# Patient Record
Sex: FEMALE | Race: OTHER | HISPANIC OR LATINO | URBAN - METROPOLITAN AREA
[De-identification: names, ages, dates, MRNs, and addresses within clinical notes are randomized per-mention and may not be internally consistent; named-entity substitution may affect disease eponyms.]

---

## 2023-04-24 ENCOUNTER — INPATIENT (INPATIENT)
Facility: HOSPITAL | Age: 38
LOS: 1 days | Discharge: HOME | DRG: 373 | End: 2023-04-26
Attending: FAMILY MEDICINE | Admitting: FAMILY MEDICINE
Payer: MEDICAID

## 2023-04-24 VITALS — WEIGHT: 126.1 LBS

## 2023-04-24 DIAGNOSIS — Z29.9 ENCOUNTER FOR PROPHYLACTIC MEASURES, UNSPECIFIED: ICD-10-CM

## 2023-04-24 DIAGNOSIS — K56.609 UNSPECIFIED INTESTINAL OBSTRUCTION, UNSPECIFIED AS TO PARTIAL VERSUS COMPLETE OBSTRUCTION: ICD-10-CM

## 2023-04-24 DIAGNOSIS — A15.9 RESPIRATORY TUBERCULOSIS UNSPECIFIED: ICD-10-CM

## 2023-04-24 LAB
ALBUMIN SERPL ELPH-MCNC: 3.9 G/DL — SIGNIFICANT CHANGE UP (ref 3.3–5)
ALP SERPL-CCNC: 102 U/L — SIGNIFICANT CHANGE UP (ref 40–120)
ALT FLD-CCNC: 48 U/L — SIGNIFICANT CHANGE UP (ref 12–78)
ANION GAP SERPL CALC-SCNC: 6 MMOL/L — SIGNIFICANT CHANGE UP (ref 5–17)
APPEARANCE UR: CLEAR — SIGNIFICANT CHANGE UP
AST SERPL-CCNC: 29 U/L — SIGNIFICANT CHANGE UP (ref 15–37)
BASOPHILS # BLD AUTO: 0.04 K/UL — SIGNIFICANT CHANGE UP (ref 0–0.2)
BASOPHILS NFR BLD AUTO: 0.4 % — SIGNIFICANT CHANGE UP (ref 0–2)
BILIRUB SERPL-MCNC: 0.3 MG/DL — SIGNIFICANT CHANGE UP (ref 0.2–1.2)
BILIRUB UR-MCNC: NEGATIVE — SIGNIFICANT CHANGE UP
BUN SERPL-MCNC: 8 MG/DL — SIGNIFICANT CHANGE UP (ref 7–23)
CALCIUM SERPL-MCNC: 9 MG/DL — SIGNIFICANT CHANGE UP (ref 8.5–10.1)
CHLORIDE SERPL-SCNC: 107 MMOL/L — SIGNIFICANT CHANGE UP (ref 96–108)
CO2 SERPL-SCNC: 26 MMOL/L — SIGNIFICANT CHANGE UP (ref 22–31)
COLOR SPEC: SIGNIFICANT CHANGE UP
CREAT SERPL-MCNC: 0.79 MG/DL — SIGNIFICANT CHANGE UP (ref 0.5–1.3)
DIFF PNL FLD: NEGATIVE — SIGNIFICANT CHANGE UP
EGFR: 98 ML/MIN/1.73M2 — SIGNIFICANT CHANGE UP
EOSINOPHIL # BLD AUTO: 0.06 K/UL — SIGNIFICANT CHANGE UP (ref 0–0.5)
EOSINOPHIL NFR BLD AUTO: 0.6 % — SIGNIFICANT CHANGE UP (ref 0–6)
GLUCOSE SERPL-MCNC: 103 MG/DL — HIGH (ref 70–99)
GLUCOSE UR QL: NEGATIVE — SIGNIFICANT CHANGE UP
HCG SERPL-ACNC: <1 MIU/ML — SIGNIFICANT CHANGE UP
HCT VFR BLD CALC: 40.6 % — SIGNIFICANT CHANGE UP (ref 34.5–45)
HGB BLD-MCNC: 12.8 G/DL — SIGNIFICANT CHANGE UP (ref 11.5–15.5)
IMM GRANULOCYTES NFR BLD AUTO: 0.4 % — SIGNIFICANT CHANGE UP (ref 0–0.9)
KETONES UR-MCNC: ABNORMAL
LEUKOCYTE ESTERASE UR-ACNC: NEGATIVE — SIGNIFICANT CHANGE UP
LIDOCAIN IGE QN: 94 U/L — SIGNIFICANT CHANGE UP (ref 73–393)
LYMPHOCYTES # BLD AUTO: 0.87 K/UL — LOW (ref 1–3.3)
LYMPHOCYTES # BLD AUTO: 8.6 % — LOW (ref 13–44)
MCHC RBC-ENTMCNC: 25.5 PG — LOW (ref 27–34)
MCHC RBC-ENTMCNC: 31.5 GM/DL — LOW (ref 32–36)
MCV RBC AUTO: 80.9 FL — SIGNIFICANT CHANGE UP (ref 80–100)
MONOCYTES # BLD AUTO: 1.15 K/UL — HIGH (ref 0–0.9)
MONOCYTES NFR BLD AUTO: 11.4 % — SIGNIFICANT CHANGE UP (ref 2–14)
NEUTROPHILS # BLD AUTO: 7.9 K/UL — HIGH (ref 1.8–7.4)
NEUTROPHILS NFR BLD AUTO: 78.6 % — HIGH (ref 43–77)
NITRITE UR-MCNC: NEGATIVE — SIGNIFICANT CHANGE UP
NRBC # BLD: 0 /100 WBCS — SIGNIFICANT CHANGE UP (ref 0–0)
PH UR: 6.5 — SIGNIFICANT CHANGE UP (ref 5–8)
PLATELET # BLD AUTO: 170 K/UL — SIGNIFICANT CHANGE UP (ref 150–400)
POTASSIUM SERPL-MCNC: 4.2 MMOL/L — SIGNIFICANT CHANGE UP (ref 3.5–5.3)
POTASSIUM SERPL-SCNC: 4.2 MMOL/L — SIGNIFICANT CHANGE UP (ref 3.5–5.3)
PROT SERPL-MCNC: 8.3 G/DL — SIGNIFICANT CHANGE UP (ref 6–8.3)
PROT UR-MCNC: NEGATIVE — SIGNIFICANT CHANGE UP
RBC # BLD: 5.02 M/UL — SIGNIFICANT CHANGE UP (ref 3.8–5.2)
RBC # FLD: 17.2 % — HIGH (ref 10.3–14.5)
SODIUM SERPL-SCNC: 139 MMOL/L — SIGNIFICANT CHANGE UP (ref 135–145)
SP GR SPEC: 1 — LOW (ref 1.01–1.02)
UROBILINOGEN FLD QL: NEGATIVE — SIGNIFICANT CHANGE UP
WBC # BLD: 10.06 K/UL — SIGNIFICANT CHANGE UP (ref 3.8–10.5)
WBC # FLD AUTO: 10.06 K/UL — SIGNIFICANT CHANGE UP (ref 3.8–10.5)

## 2023-04-24 PROCEDURE — 99285 EMERGENCY DEPT VISIT HI MDM: CPT

## 2023-04-24 PROCEDURE — 76705 ECHO EXAM OF ABDOMEN: CPT | Mod: 26

## 2023-04-24 PROCEDURE — 74177 CT ABD & PELVIS W/CONTRAST: CPT | Mod: 26,MA

## 2023-04-24 PROCEDURE — 99223 1ST HOSP IP/OBS HIGH 75: CPT | Mod: GC

## 2023-04-24 RX ORDER — ACETAMINOPHEN 500 MG
650 TABLET ORAL EVERY 6 HOURS
Refills: 0 | Status: DISCONTINUED | OUTPATIENT
Start: 2023-04-24 | End: 2023-04-26

## 2023-04-24 RX ORDER — SODIUM CHLORIDE 9 MG/ML
1000 INJECTION INTRAMUSCULAR; INTRAVENOUS; SUBCUTANEOUS ONCE
Refills: 0 | Status: COMPLETED | OUTPATIENT
Start: 2023-04-24 | End: 2023-04-24

## 2023-04-24 RX ORDER — FAMOTIDINE 10 MG/ML
20 INJECTION INTRAVENOUS ONCE
Refills: 0 | Status: COMPLETED | OUTPATIENT
Start: 2023-04-24 | End: 2023-04-24

## 2023-04-24 RX ORDER — ACETAMINOPHEN 500 MG
1000 TABLET ORAL ONCE
Refills: 0 | Status: COMPLETED | OUTPATIENT
Start: 2023-04-24 | End: 2023-04-24

## 2023-04-24 RX ORDER — ETHAMBUTOL HYDROCHLORIDE 400 MG/1
1000 TABLET, FILM COATED ORAL DAILY
Refills: 0 | Status: DISCONTINUED | OUTPATIENT
Start: 2023-04-24 | End: 2023-04-26

## 2023-04-24 RX ORDER — ONDANSETRON 8 MG/1
4 TABLET, FILM COATED ORAL ONCE
Refills: 0 | Status: COMPLETED | OUTPATIENT
Start: 2023-04-24 | End: 2023-04-24

## 2023-04-24 RX ORDER — KETOROLAC TROMETHAMINE 30 MG/ML
15 SYRINGE (ML) INJECTION ONCE
Refills: 0 | Status: DISCONTINUED | OUTPATIENT
Start: 2023-04-24 | End: 2023-04-24

## 2023-04-24 RX ORDER — ONDANSETRON 8 MG/1
4 TABLET, FILM COATED ORAL EVERY 4 HOURS
Refills: 0 | Status: DISCONTINUED | OUTPATIENT
Start: 2023-04-24 | End: 2023-04-26

## 2023-04-24 RX ORDER — ALBUTEROL 90 UG/1
2 AEROSOL, METERED ORAL EVERY 6 HOURS
Refills: 0 | Status: DISCONTINUED | OUTPATIENT
Start: 2023-04-24 | End: 2023-04-26

## 2023-04-24 RX ORDER — PYRIDOXINE HCL (VITAMIN B6) 100 MG
50 TABLET ORAL DAILY
Refills: 0 | Status: DISCONTINUED | OUTPATIENT
Start: 2023-04-24 | End: 2023-04-26

## 2023-04-24 RX ORDER — LANOLIN ALCOHOL/MO/W.PET/CERES
3 CREAM (GRAM) TOPICAL AT BEDTIME
Refills: 0 | Status: DISCONTINUED | OUTPATIENT
Start: 2023-04-24 | End: 2023-04-26

## 2023-04-24 RX ADMIN — SODIUM CHLORIDE 1000 MILLILITER(S): 9 INJECTION INTRAMUSCULAR; INTRAVENOUS; SUBCUTANEOUS at 18:51

## 2023-04-24 RX ADMIN — ONDANSETRON 4 MILLIGRAM(S): 8 TABLET, FILM COATED ORAL at 14:31

## 2023-04-24 RX ADMIN — Medication 400 MILLIGRAM(S): at 14:32

## 2023-04-24 RX ADMIN — Medication 1000 MILLIGRAM(S): at 14:47

## 2023-04-24 RX ADMIN — FAMOTIDINE 20 MILLIGRAM(S): 10 INJECTION INTRAVENOUS at 17:30

## 2023-04-24 RX ADMIN — Medication 30 MILLILITER(S): at 17:30

## 2023-04-24 RX ADMIN — Medication 1000 MILLIGRAM(S): at 15:02

## 2023-04-24 RX ADMIN — SODIUM CHLORIDE 1000 MILLILITER(S): 9 INJECTION INTRAMUSCULAR; INTRAVENOUS; SUBCUTANEOUS at 17:51

## 2023-04-24 RX ADMIN — Medication 400 MILLIGRAM(S): at 21:44

## 2023-04-24 RX ADMIN — ONDANSETRON 4 MILLIGRAM(S): 8 TABLET, FILM COATED ORAL at 17:51

## 2023-04-24 RX ADMIN — SODIUM CHLORIDE 1000 MILLILITER(S): 9 INJECTION INTRAMUSCULAR; INTRAVENOUS; SUBCUTANEOUS at 15:31

## 2023-04-24 RX ADMIN — Medication 15 MILLIGRAM(S): at 18:22

## 2023-04-24 RX ADMIN — SODIUM CHLORIDE 1000 MILLILITER(S): 9 INJECTION INTRAMUSCULAR; INTRAVENOUS; SUBCUTANEOUS at 14:31

## 2023-04-24 RX ADMIN — ONDANSETRON 4 MILLIGRAM(S): 8 TABLET, FILM COATED ORAL at 21:50

## 2023-04-24 RX ADMIN — Medication 15 MILLIGRAM(S): at 17:52

## 2023-04-24 RX ADMIN — Medication 1000 MILLIGRAM(S): at 22:14

## 2023-04-24 NOTE — H&P ADULT - NSHPPHYSICALEXAM_GEN_ALL_CORE
GENERAL: patient appears well, NAD, appropriate, pleasant  EYES: sclera clear, no exudates  ENMT: oropharynx clear without erythema, no exudates, mmm  NECK: supple, soft, no thyromegaly noted  LUNGS: CTA b/l, good air entry b/l, symmetric breath sounds, no w/r/r  HEART: soft S1/S2, RRR, no m/r/g, no lower extremity edema  GASTROINTESTINAL: Soft NTND, Bowel sounds normactive, no palpable masses  INTEGUMENT: good skin turgor, warm skin, appears well perfused  MUSCULOSKELETAL: no clubbing or cyanosis, no obvious deformity  NEUROLOGIC: A&O x3, good muscle tone in 4 extremities, no obvious sensory deficits  PSYCHIATRIC: mood is good, affect is congruent, linear and logical thought process  HEME/LYMPH: no palpable supraclavicular nodules, no obvious ecchymosis or petechiae GENERAL: patient appears well, NAD, appropriate, pleasant  EYES: sclera clear, no exudates  ENMT: oropharynx clear without erythema, no exudates, mmm  LUNGS: CTA b/l, good air entry b/l, symmetric breath sounds, no w/r/r  HEART: soft S1/S2, RRR, no m/r/g, no lower extremity edema  GASTROINTESTINAL: Soft TTP epigastric/R abd, non-distended, Bowel sounds normactive, no palpable masses  INTEGUMENT: good skin turgor, warm skin, appears well perfused  MUSCULOSKELETAL: no clubbing or cyanosis, no obvious deformity  NEUROLOGIC: A&O x3, good muscle tone in 4 extremities, no obvious sensory deficits  PSYCHIATRIC: mood is good, affect is congruent, linear and logical thought process  HEME/LYMPH: no palpable supraclavicular nodules, no obvious ecchymosis or petechiae GENERAL: patient appears well, NAD, appropriate, pleasant  EYES: sclera clear, no exudates  ENMT: oropharynx clear without erythema, no exudates, mmm  LUNGS: CTA b/l, good air entry b/l, symmetric breath sounds, no w/r/r  HEART: soft S1/S2, RRR, no m/r/g, no lower extremity edema  GASTROINTESTINAL: Soft, TTP epigastric/R abd, non-distended, Bowel sounds normactive, no palpable masses  INTEGUMENT: good skin turgor, warm skin, appears well perfused  MUSCULOSKELETAL: no clubbing or cyanosis, no obvious deformity  NEUROLOGIC: A&O x3, good muscle tone in 4 extremities, no obvious sensory deficits  PSYCHIATRIC: mood is good, affect is congruent, linear and logical thought process  HEME/LYMPH: no palpable supraclavicular nodules, no obvious ecchymosis or petechiae

## 2023-04-24 NOTE — H&P ADULT - ATTENDING COMMENTS
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 Admit for SBO likely 2/2 abdominal mycobacteria bovis.Plan: monitor i/os, apprec gen surg recs, apprec ID recs, monitor clinical course, pain control on demand, serial abd exams, apprec GI recs

## 2023-04-24 NOTE — H&P ADULT - PROBLEM SELECTOR PLAN 2
SCDs for DVT prophylaxis pending possible though unlikely OR Admitted to [facility] 3 weeks ago for 9 days and was started on anti-tb abx; was initially told latent though positive sputum culture per patient  Concern for abdominal extention of infx per CT findings as follows  CT A&P:   - Heterogeneous left subdiaphragmatic peritoneal nodularity measuring up to 8.2 cm in conglomerate, which is favored to be infectious but peritoneal metastases not excluded.  - 1.5 cm ill-defined hypodensity in the inferior pole left kidney, which may represent infection and possibly same process as the bowel and peritoneal processes.  - Mild right lower quadrant mesenteric lymphadenopathy, which may be reactive or metastatic.  Continue ethambutol, rifampin, moxifloxicin, vit b6  ID consulted, f/u recs  GI consult, f/u recs Admitted to [facility] 3 weeks ago for 9 days and was started on anti-tb abx; was initially told latent though positive sputum culture per patient  Concern for abdominal extension of infx per CT findings as follows  CT A&P:   - Heterogeneous left subdiaphragmatic peritoneal nodularity measuring up to 8.2 cm in conglomerate, which is favored to be infectious but peritoneal metastases not excluded.  - 1.5 cm ill-defined hypodensity in the inferior pole left kidney, which may represent infection and possibly same process as the bowel and peritoneal processes.  - Mild right lower quadrant mesenteric lymphadenopathy, which may be reactive or metastatic.  Continue ethambutol, rifampin, moxifloxicin (pt  to bring from home), and vit b6  Out of isolation period  ID consulted, f/u recs  GI consult, f/u recs Pulmonary w/ concern for abdominal extension based on CT imaging  Admitted to [facility] 3 weeks ago for 9 days and was started on anti-tb abx; was initially told latent though positive sputum culture per patient  CT A&P: Heterogeneous left subdiaphragmatic peritoneal nodularity measuring up to 8.2 cm in conglomerate. 1.5 cm ill-defined hypodensity in the inferior pole left kidney, which may represent infection and possibly same process as the bowel and peritoneal processes. Mild right lower quadrant mesenteric lymphadenopathy, which may be reactive or metastatic.  Continue ethambutol, rifampin, moxifloxacin (pt  to bring from home), and vit b6  Out of isolation period  ID consulted, f/u recs  GI consult, f/u recs

## 2023-04-24 NOTE — ED PROVIDER NOTE - PHYSICAL EXAMINATION
Vital signs as available reviewed.  General:  No acute distress.  Head:  Normocephalic, atraumatic.  Eyes:  Conjunctiva pink, no icterus.  Cardiovascular:  Regular rate, no obvious murmur.  Respiratory:  Clear to auscultation, good air entry bilaterally.  Abdomen:  Soft, + Parker's sign. + RLQ tenderness to palpation.  Musculoskeletal:  No obvious deformity.  Neurologic: Alert and oriented, moving all extremities.  Skin:  Warm and dry.

## 2023-04-24 NOTE — H&P ADULT - PROBLEM SELECTOR PLAN 1
Surgery consulted Dr. Quijano recs appreciated  - Conservative management  - Low threshold to place NGT if patient has another episode of emesis  - Continue TB medications  - pSBO likely secondary to inflammation from infectious process, making patient a poor candidate for surgery/low indication for necessity Partial SBO  S/P 1L NS bolus x 2; Ofirmev famotidine, ketorolac, Zofran x 2, Maalox x 1  CT A&P -  Partial small bowel obstruction due to irregular bowel wall thickening and inflammation involving the terminal ileum, cecum and base of the appendix.  RUQ U/S - Mild hepatomegaly. Question mild liver surface nodularity, raising the possibility of underlying cirrhosis. Unremarkable sonographic appearance of the gallbladder. No biliary duct dilation. Trace perihepatic ascites.  Lead point for SBO likely related to abd TB/infx process  NPO except meds  Conservative management  GI consult, f/u recs  Surgery consulted Dr. Quijano, recs appreciated  - Low threshold to place NGT if patient has another episode of emesis  - pSBO likely secondary to inflammation from infectious process, making patient a poor candidate for surgery/low indication for necessity Partial SBO  S/P 1L NS bolus x 2; Ofirmev, famotidine, ketorolac, Zofran x 2, Maalox x 1  CT A&P -  Partial small bowel obstruction due to irregular bowel wall thickening and inflammation involving the terminal ileum, cecum and base of the appendix.  RUQ U/S - Mild hepatomegaly. Question mild liver surface nodularity, raising the possibility of underlying cirrhosis. Unremarkable sonographic appearance of the gallbladder. No biliary duct dilation. Trace perihepatic ascites.  Lead point for SBO likely related to abd TB/infx process  NPO except meds  Conservative mgmt  GI consult, f/u recs  Surgery consulted Dr. Quijano, recs appreciated  - Low threshold to place NGT if patient has another episode of emesis  - pSBO likely secondary to inflammation from infectious process, making patient a poor candidate for surgery/low indication for necessity Partial SBO  S/P 1L NS bolus x 2; Ofirmev, famotidine, ketorolac, Zofran x 2, Maalox x 1  CT A&P -  Partial small bowel obstruction due to irregular bowel wall thickening and inflammation involving the terminal ileum, cecum and base of the appendix.  RUQ U/S - Mild hepatomegaly. Question mild liver surface nodularity, raising the possibility of underlying cirrhosis. Unremarkable sonographic appearance of the gallbladder. No biliary duct dilation. Trace perihepatic ascites.  SBO likely 2/2 to abd TB/infx process  NPO except meds  Conservative mgmt&  Strict I/Os  GI consult, f/u recs  Surgery consulted Dr. Quijano, recs appreciated  - Low threshold to place NGT if patient has another episode of emesis  - pSBO likely secondary to inflammation from infectious process, making patient a poor candidate for surgery/low indication for necessity

## 2023-04-24 NOTE — ED ADULT NURSE NOTE - OBJECTIVE STATEMENT
Pt presents to the ED s/p abd pain, nausea, vomiting since yesterday afternoon. Pt denies fever, chills, diarrhea.

## 2023-04-24 NOTE — ED PROVIDER NOTE - OBJECTIVE STATEMENT
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  Pain crampy and intermittent, no radiation of pain.  Pain started yesterday.  No meds taken for pain.  No sick contacts.  No reported fevers or chills.

## 2023-04-24 NOTE — H&P ADULT - PROBLEM SELECTOR PLAN 3
SCDs for DVT prophylaxis pending possible though unlikely OR SCDs and ambulation for DVT prophylaxis pending possible though unlikely OR SCDs and ambulation for DVT prophylaxis pending possible though unlikely OR        #Medication mgmt  Relvar inhaler - family should be notified to bring from home if used daily, not on formulary  Moxifloxacin -  aware and will bring to hospital

## 2023-04-24 NOTE — H&P ADULT - ASSESSMENT
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 Admit for SBO likely 2/2 abdominal mycobacteria bovis.      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  Pain crampy and intermittent, no radiation of pain.  Pain started yesterday.  No meds taken for pain.  No sick contacts.  No reported fevers or chills. Since November of 2023 patient has had pleuritic chest pain, night sweats, and cough and 3 weeks ago she was dx w/ latent TB and subsequently informed it was active TB w/ positive sputum; admitted for 9 days at [facility] and started on anti-tb trx (ethambutol, rifampin, and moxifloxacin 2/2 rxn to [facility]), Follows with TB clinic at [facility]. Pt from [facility], moved to the  2 years ago. Denies chest pain, palpitations, SOB, cough, diarrhea, constipation, urinary frequency, urgency, or dysuria, headaches, changes in vision, dizziness, numbness, tingling. Denies recent travel or sick contacts.     IN THE ED:  Temp  99.1 F , HR 91 , BP 92/60  ,RR 18 , SpO2 99% on RA  S/P 1L NS bolus x 2; Ofirmev famotidine, ketorolac, Zofran x 2, Maalox x 1  Imaging:   CT A&P:   - Partial small bowel obstruction due to irregular bowel wall thickening and inflammation involving the terminal ileum, cecum and base of the appendix. Differential considerations include infection (including atypical/tuberculous infection given patient's history), inflammatory   bowel disease, or malignancy. Consider correlation with colonoscopy.  - Heterogeneous left subdiaphragmatic peritoneal nodularity measuring up to 8.2 cm in conglomerate, which is favored to be infectious but peritoneal metastases not excluded.  - 1.5 cm ill-defined hypodensity in the inferior pole left kidney, which may represent infection and possibly same process as the bowel and peritoneal processes.  - Mild right lower quadrant mesenteric lymphadenopathy, which may be reactive or metastatic. 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 Admit for SBO likely 2/2 abdominal mycobacteria bovis.

## 2023-04-24 NOTE — H&P ADULT - HISTORY OF PRESENT ILLNESS
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  Pain crampy and intermittent, no radiation of pain.  Pain started yesterday.  No meds taken for pain.  No sick contacts.  No reported fevers or chills.    IN THE ED:  Temp  99.1 F , HR 91 , BP 92/60  ,RR 18 , SpO2 99% on RA  S/P 1L NS bolus x 2; Ofirmev famotidine, ketorolac, Zofran x 2, Maalox x 1  EKG:  Imaging:   CT A&P:   - Partial small bowel obstruction due to irregular bowel wall thickening and inflammation involving the terminal ileum, cecum and base of the appendix. Differential considerations include infection (including atypical/tuberculous infection given patient's history), inflammatory   bowel disease, or malignancy. Consider correlation with colonoscopy.  - Heterogeneous left subdiaphragmatic peritoneal nodularity measuring up to 8.2 cm in conglomerate, which is favored to be infectious but peritoneal metastases not excluded.  - 1.5 cm ill-defined hypodensity in the inferior pole left kidney, which may represent infection and possibly same process as the bowel and peritoneal processes.  - Mild right lower quadrant mesenteric lymphadenopathy, which may be reactive or metastatic.    RUQ U/S - Mild hepatomegaly. Question mild liver surface nodularity, raising the possibility of underlying cirrhosis. Unremarkable sonographic appearance of the gallbladder. No biliary duct dilation.Trace perihepatic ascites. 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  Pain crampy and intermittent, no radiation of pain.  Pain started yesterday.  No meds taken for pain.  No sick contacts.  No reported fevers or chills. Since November of 2023 patient has had pleuritic chest pain, night sweats, and cough and 3 weeks ago she was dx w/ latent TB and subsequently informed it was active TB w/ positive sputum; admitted for 9 days at [facility] and started on anti-tb trx (ethambutol, rifampin, and moxifloxacin 2/2 rxn to [facility]), Follows with TB clinic at [facility]. Pt from [facility], [place] the [place] 2 [place] ago. Denies chest pain, palpitations, SOB, cough, diarrhea, constipation, urinary frequency, urgency, or dysuria, headaches, changes in vision, dizziness, numbness, tingling. Denies recent travel or sick contacts.     IN THE ED:  Temp  99.1 F , HR 91 , BP 92/60  ,RR 18 , SpO2 99% on RA  S/P 1L NS bolus x 2; Ofirmev famotidine, ketorolac, Zofran x 2, Maalox x 1  Imaging:   CT A&P:   - Partial small bowel obstruction due to irregular bowel wall thickening and inflammation involving the terminal ileum, cecum and base of the appendix. Differential considerations include infection (including atypical/tuberculous infection given patient's history), inflammatory   bowel disease, or malignancy. Consider correlation with colonoscopy.  - Heterogeneous left subdiaphragmatic peritoneal nodularity measuring up to 8.2 cm in conglomerate, which is favored to be infectious but peritoneal metastases not excluded.  - 1.5 cm ill-defined hypodensity in the inferior pole left kidney, which may represent infection and possibly same process as the bowel and peritoneal processes.  - Mild right lower quadrant mesenteric lymphadenopathy, which may be reactive or metastatic.    RUQ U/S - Mild hepatomegaly. Question mild liver surface nodularity, raising the possibility of underlying cirrhosis. Unremarkable sonographic appearance of the gallbladder. No biliary duct dilation.Trace perihepatic ascites. 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  Pain crampy and intermittent, no radiation of pain.  Pain started yesterday.  No meds taken for pain. Since November of 2023 patient has had pleuritic chest pain, night sweats, and cough and per patient 3 weeks ago she was dx w/ latent TB and subsequently informed it was active TB w/ positive sputum; admitted for 9 days at [facility] and started on anti-tb trx (ethambutol, rifampin, and moxifloxacin 2/2 rxn to [facility]), Follows with TB clinic at [facility]. Pt from [facility], [place] the [place] 2 [place] ago. Denies chest pain, palpitations, SOB, cough, diarrhea, constipation, urinary frequency, urgency, or dysuria, headaches, changes in vision, dizziness, numbness, tingling. Denies recent travel or sick contacts.     IN THE ED:  Temp  99.1 F , HR 91 , BP 92/60  ,RR 18 , SpO2 99% on RA  S/P 1L NS bolus x 2; Ofirmev, famotidine, ketorolac, Zofran x 2, Maalox x 1  Imaging:   CT A&P:   - Partial small bowel obstruction due to irregular bowel wall thickening and inflammation involving the terminal ileum, cecum and base of the appendix. Differential considerations include infection (including atypical/tuberculous infection given patient's history), inflammatory   bowel disease, or malignancy. Consider correlation with colonoscopy.  - Heterogeneous left subdiaphragmatic peritoneal nodularity measuring up to 8.2 cm in conglomerate, which is favored to be infectious but peritoneal metastases not excluded.  - 1.5 cm ill-defined hypodensity in the inferior pole left kidney, which may represent infection and possibly same process as the bowel and peritoneal processes.  - Mild right lower quadrant mesenteric lymphadenopathy, which may be reactive or metastatic.    RUQ U/S - Mild hepatomegaly. Question mild liver surface nodularity, raising the possibility of underlying cirrhosis. Unremarkable sonographic appearance of the gallbladder. No biliary duct dilation. Trace perihepatic ascites.

## 2023-04-24 NOTE — ED PROVIDER NOTE - NSFOLLOWUPINSTRUCTIONS_ED_ALL_ED_FT
Please follow up with your Primary Care Physician and any specialists as discussed.  Please take your medications as prescribed and or instructed.  If your symptoms persist or worsen, please seek care. Either return to the Emergency Department, go to urgent care or see your primary care doctor.  Please refer to general information and instructions attached or below:     Acute Abdominal Pain    WHAT YOU NEED TO KNOW:    The cause of your abdominal pain may not be found. If a cause is found, treatment will depend on what the cause is.     DISCHARGE INSTRUCTIONS:    Return to the emergency department if:     You vomit blood or cannot stop vomiting.      You have blood in your bowel movement or it looks like tar.       You have bleeding from your rectum.       Your abdomen is larger than usual, more painful, and hard.       You have severe pain in your abdomen.       You stop passing gas and having bowel movements.       You feel weak, dizzy, or faint.    Contact your healthcare provider if:     You have a fever.      You have new signs and symptoms.      Your symptoms do not get better with treatment.       You have questions or concerns about your condition or care.    Medicines may be given to decrease pain, treat an infection, and manage your symptoms. Take your medicine as directed. Call your healthcare provider if you think your medicine is not helping or if you have side effects. Tell him if you are allergic to any medicine. Keep a list of the medicines, vitamins, and herbs you take. Include the amounts, and when and why you take them. Bring the list or the pill bottles to follow-up visits. Carry your medicine list with you in case of an emergency.    Manage your symptoms:     Apply heat on your abdomen for 20 to 30 minutes every 2 hours for as many days as directed. Heat helps decrease pain and muscle spasms.       Manage your stress. Stress may cause abdominal pain. Your healthcare provider may recommend relaxation techniques and deep breathing exercises to help decrease your stress. Your healthcare provider may recommend you talk to someone about your stress or anxiety, such as a counselor or a trusted friend. Get plenty of sleep and exercise regularly.       Limit or do not drink alcohol. Alcohol can make your abdominal pain worse. Ask your healthcare provider if it is safe for you to drink alcohol. Also ask how much is safe for you to drink.       Do not smoke. Nicotine and other chemicals in cigarettes can damage your esophagus and stomach. Ask your healthcare provider for information if you currently smoke and need help to quit. E-cigarettes or smokeless tobacco still contain nicotine. Talk to your healthcare provider before you use these products.     Make changes to the food you eat as directed: Do not eat foods that cause abdominal pain or other symptoms. Eat small meals more often.     Eat more high-fiber foods if you are constipated. High-fiber foods include fruits, vegetables, whole-grain foods, and legumes.       Do not eat foods that cause gas if you have bloating. Examples include broccoli, cabbage, and cauliflower. Do not drink soda or carbonated drinks, because these may also cause gas.       Do not eat foods or drinks that contain sorbitol or fructose if you have diarrhea and bloating. Some examples are fruit juices, candy, jelly, and sugar-free gum.       Do not eat high-fat foods, such as fried foods, cheeseburgers, hot dogs, and desserts.      Limit or do not drink caffeine. Caffeine may make symptoms, such as heart burn or nausea, worse.       Drink plenty of liquids to prevent dehydration from diarrhea or vomiting. Ask your healthcare provider how much liquid to drink each day and which liquids are best for you.

## 2023-04-24 NOTE — ED PROVIDER NOTE - CARE PLAN
Principal Discharge DX:	Abdominal pain  Secondary Diagnosis:	Nausea and vomiting   1 Principal Discharge DX:	Small bowel obstruction  Secondary Diagnosis:	Nausea and vomiting

## 2023-04-24 NOTE — H&P ADULT - NSHPSOCIALHISTORY_GEN_ALL_CORE
Lives at ___ with ____  ADLs:  Ambulates ____  Alcohol:  Tobacco:  Drugs:  Vaccine: Lives at home w/  and kids  Alcohol: no  Tobacco: no  Drugs: no

## 2023-04-24 NOTE — H&P ADULT - NSHPREVIEWOFSYSTEMS_GEN_ALL_CORE
Constitutional: denies fever, chills, sweating  HEENT: denies headache, dizziness, or lightheadedness  Respiratory: denies SOB, cough, or wheezing  Cardiovascular: denies CP, palpitations  Gastrointestinal: denies nausea, vomiting, diarrhea, constipation, abdominal pain, or bloody stools  Genitourinary: denies painful urination, increased frequency, urgency, or bloody urine  Skin/Breast: denies rashes or itching  Musculoskeletal: denies muscle aches, joint swelling, or muscle weakness  Neurologic: denies loss of sensation, numbness, or tingling  ROS negative except as noted above Constitutional: + Chills w/ pain. denies fever, sweating  HEENT: denies headache, dizziness, or lightheadedness  Respiratory: denies SOB, cough, or wheezing  Cardiovascular: denies CP, palpitations  Gastrointestinal: + nausea, vomiting, abdominal pain. Denies diarrhea, constipation, or bloody stools  Genitourinary: denies painful urination, increased frequency, urgency, or bloody urine  Skin/Breast: denies rashes or itching  Musculoskeletal: denies muscle aches, joint swelling, or muscle weakness  Neurologic: denies loss of sensation, numbness, or tingling  ROS negative except as noted above

## 2023-04-25 LAB
ALBUMIN SERPL ELPH-MCNC: 3.5 G/DL — SIGNIFICANT CHANGE UP (ref 3.3–5)
ALP SERPL-CCNC: 94 U/L — SIGNIFICANT CHANGE UP (ref 40–120)
ALT FLD-CCNC: 44 U/L — SIGNIFICANT CHANGE UP (ref 12–78)
ANION GAP SERPL CALC-SCNC: 7 MMOL/L — SIGNIFICANT CHANGE UP (ref 5–17)
AST SERPL-CCNC: 28 U/L — SIGNIFICANT CHANGE UP (ref 15–37)
BASOPHILS # BLD AUTO: 0.04 K/UL — SIGNIFICANT CHANGE UP (ref 0–0.2)
BASOPHILS NFR BLD AUTO: 0.5 % — SIGNIFICANT CHANGE UP (ref 0–2)
BILIRUB SERPL-MCNC: 0.5 MG/DL — SIGNIFICANT CHANGE UP (ref 0.2–1.2)
BUN SERPL-MCNC: 10 MG/DL — SIGNIFICANT CHANGE UP (ref 7–23)
CALCIUM SERPL-MCNC: 8.3 MG/DL — LOW (ref 8.5–10.1)
CHLORIDE SERPL-SCNC: 111 MMOL/L — HIGH (ref 96–108)
CO2 SERPL-SCNC: 22 MMOL/L — SIGNIFICANT CHANGE UP (ref 22–31)
CREAT SERPL-MCNC: 0.71 MG/DL — SIGNIFICANT CHANGE UP (ref 0.5–1.3)
EGFR: 112 ML/MIN/1.73M2 — SIGNIFICANT CHANGE UP
EOSINOPHIL # BLD AUTO: 0.09 K/UL — SIGNIFICANT CHANGE UP (ref 0–0.5)
EOSINOPHIL NFR BLD AUTO: 1 % — SIGNIFICANT CHANGE UP (ref 0–6)
GLUCOSE SERPL-MCNC: 105 MG/DL — HIGH (ref 70–99)
HCT VFR BLD CALC: 38.4 % — SIGNIFICANT CHANGE UP (ref 34.5–45)
HGB BLD-MCNC: 12.4 G/DL — SIGNIFICANT CHANGE UP (ref 11.5–15.5)
IMM GRANULOCYTES NFR BLD AUTO: 0.3 % — SIGNIFICANT CHANGE UP (ref 0–0.9)
LYMPHOCYTES # BLD AUTO: 0.67 K/UL — LOW (ref 1–3.3)
LYMPHOCYTES # BLD AUTO: 7.6 % — LOW (ref 13–44)
MCHC RBC-ENTMCNC: 25.9 PG — LOW (ref 27–34)
MCHC RBC-ENTMCNC: 32.3 GM/DL — SIGNIFICANT CHANGE UP (ref 32–36)
MCV RBC AUTO: 80.3 FL — SIGNIFICANT CHANGE UP (ref 80–100)
MONOCYTES # BLD AUTO: 1.05 K/UL — HIGH (ref 0–0.9)
MONOCYTES NFR BLD AUTO: 12 % — SIGNIFICANT CHANGE UP (ref 2–14)
NEUTROPHILS # BLD AUTO: 6.9 K/UL — SIGNIFICANT CHANGE UP (ref 1.8–7.4)
NEUTROPHILS NFR BLD AUTO: 78.6 % — HIGH (ref 43–77)
NRBC # BLD: 0 /100 WBCS — SIGNIFICANT CHANGE UP (ref 0–0)
PLATELET # BLD AUTO: 165 K/UL — SIGNIFICANT CHANGE UP (ref 150–400)
POTASSIUM SERPL-MCNC: 3.9 MMOL/L — SIGNIFICANT CHANGE UP (ref 3.5–5.3)
POTASSIUM SERPL-SCNC: 3.9 MMOL/L — SIGNIFICANT CHANGE UP (ref 3.5–5.3)
PROT SERPL-MCNC: 7.3 G/DL — SIGNIFICANT CHANGE UP (ref 6–8.3)
RBC # BLD: 4.78 M/UL — SIGNIFICANT CHANGE UP (ref 3.8–5.2)
RBC # FLD: 16.8 % — HIGH (ref 10.3–14.5)
SODIUM SERPL-SCNC: 140 MMOL/L — SIGNIFICANT CHANGE UP (ref 135–145)
WBC # BLD: 8.78 K/UL — SIGNIFICANT CHANGE UP (ref 3.8–10.5)
WBC # FLD AUTO: 8.78 K/UL — SIGNIFICANT CHANGE UP (ref 3.8–10.5)

## 2023-04-25 PROCEDURE — 99223 1ST HOSP IP/OBS HIGH 75: CPT

## 2023-04-25 PROCEDURE — 12345: CPT | Mod: NC

## 2023-04-25 PROCEDURE — 93010 ELECTROCARDIOGRAM REPORT: CPT

## 2023-04-25 PROCEDURE — 99233 SBSQ HOSP IP/OBS HIGH 50: CPT

## 2023-04-25 RX ORDER — METOCLOPRAMIDE HCL 10 MG
10 TABLET ORAL ONCE
Refills: 0 | Status: COMPLETED | OUTPATIENT
Start: 2023-04-25 | End: 2023-04-25

## 2023-04-25 RX ORDER — NALOXONE HYDROCHLORIDE 4 MG/.1ML
0.4 SPRAY NASAL ONCE
Refills: 0 | Status: DISCONTINUED | OUTPATIENT
Start: 2023-04-25 | End: 2023-04-26

## 2023-04-25 RX ORDER — MORPHINE SULFATE 50 MG/1
1 CAPSULE, EXTENDED RELEASE ORAL EVERY 4 HOURS
Refills: 0 | Status: DISCONTINUED | OUTPATIENT
Start: 2023-04-25 | End: 2023-04-26

## 2023-04-25 RX ORDER — MOXIFLOXACIN HYDROCHLORIDE TABLETS, 400 MG 400 MG/1
400 TABLET, FILM COATED ORAL DAILY
Refills: 0 | Status: DISCONTINUED | OUTPATIENT
Start: 2023-04-25 | End: 2023-04-26

## 2023-04-25 RX ORDER — POLYETHYLENE GLYCOL 3350 17 G/17G
17 POWDER, FOR SOLUTION ORAL DAILY
Refills: 0 | Status: DISCONTINUED | OUTPATIENT
Start: 2023-04-25 | End: 2023-04-26

## 2023-04-25 RX ORDER — MORPHINE SULFATE 50 MG/1
2 CAPSULE, EXTENDED RELEASE ORAL EVERY 4 HOURS
Refills: 0 | Status: DISCONTINUED | OUTPATIENT
Start: 2023-04-25 | End: 2023-04-26

## 2023-04-25 RX ORDER — SENNA PLUS 8.6 MG/1
2 TABLET ORAL AT BEDTIME
Refills: 0 | Status: DISCONTINUED | OUTPATIENT
Start: 2023-04-25 | End: 2023-04-26

## 2023-04-25 RX ADMIN — MORPHINE SULFATE 2 MILLIGRAM(S): 50 CAPSULE, EXTENDED RELEASE ORAL at 04:38

## 2023-04-25 RX ADMIN — Medication 10 MILLIGRAM(S): at 05:24

## 2023-04-25 RX ADMIN — SENNA PLUS 2 TABLET(S): 8.6 TABLET ORAL at 21:01

## 2023-04-25 RX ADMIN — Medication 50 MILLIGRAM(S): at 17:15

## 2023-04-25 RX ADMIN — POLYETHYLENE GLYCOL 3350 17 GRAM(S): 17 POWDER, FOR SOLUTION ORAL at 17:16

## 2023-04-25 RX ADMIN — ONDANSETRON 4 MILLIGRAM(S): 8 TABLET, FILM COATED ORAL at 02:50

## 2023-04-25 RX ADMIN — ETHAMBUTOL HYDROCHLORIDE 1000 MILLIGRAM(S): 400 TABLET, FILM COATED ORAL at 17:15

## 2023-04-25 NOTE — CARE COORDINATION ASSESSMENT. - OTHER PERTINENT DISCHARGE PLANNING INFORMATION:
Met with patient at bedside to discuss the role of case management with verbalized understanding.  Needs unclear at present.  Patient admitted with abdominal pain and SBO.  Patient currently on IVF pending further medical workup.  Will continue to follow from a case management perspective.

## 2023-04-25 NOTE — PATIENT PROFILE ADULT - FALL HARM RISK - UNIVERSAL INTERVENTIONS
Bed in lowest position, wheels locked, appropriate side rails in place/Call bell, personal items and telephone in reach/Instruct patient to call for assistance before getting out of bed or chair/Non-slip footwear when patient is out of bed/[place] to call system/Physically safe environment - no spills, clutter or unnecessary equipment/Purposeful Proactive Rounding/Room/bathroom lighting operational, light cord in reach

## 2023-04-25 NOTE — CONSULT NOTE ADULT - SUBJECTIVE AND OBJECTIVE BOX
HPI:  Ms. Iglesias is a 39 yo female with a recent history of Mycobacerium bovis infection presenting with a one day history of severe abdominal pain. She reports that she was diagnosed four weeks ago and started treatment three weeks ago but is unsure of how she contracted the infection, denies any close contact with animals, does not own any pets and denies any contact with any people known to be infected with the disease. She reports that yesterday night she had an abrupt onset of abdominal pian and nausea that was accompanied by three episodes of emesis. She reports that her last bowel movement was this morning and she is unable to recall the last time she passed gas. Reports that the last meal that she had was last night. She has has one episode of emesis since she was admitted to the ED but her symptoms have significantly improved after receiving pain medications. Denies any fever or chills, denies any chest pain or shortness of breath. Reports no smoking, drinking or drug history.     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    Asthma    REVIEW OF SYSTEMS  Head: denies headaches, dizziness & lightheadedness  Eyes: denies changes in vision, eye pain, double vision & eye discharge  Ears: denies changes in hearing & ear discharge  Nose: denies rhinorrhea  Mouth: denies bleeding gums & sore tongue & sore throat  Neck: denies swollen lymph nodes   Respiratory: denies SOB, cough, sputum production, wheezing  Cardiac: denies CP & irregular heart beat  Abdominal: +abdominal pain  : denies dysuria, frequent urination, hematuria  Musculoskeletal: denies joint pain & muscle pain  Neuro: denies involuntary muscle movements  Psych: no depression, no anxiety      Allergies  amoxicillin (Rash)    Vital Signs Last 24 Hrs  T(C): 37 (2023 16:45), Max: 37.3 (2023 16:30)  T(F): 98.6 (2023 16:45), Max: 99.1 (2023 16:30)  HR: 80 (2023 16:45) (80 - 91)  BP: 103/68 (2023 16:45) (92/60 - 103/68)  BP(mean): --  RR: 18 (2023 16:45) (16 - 18)  SpO2: 99% (2023 16:45) (98% - 99%)    Parameters below as of 2023 16:45  Patient On (Oxygen Delivery Method): room air        PHYSICAL EXAM:  Constitutional: AAOx3, no acute distress  HEENT: NCAT, airway patent  Cardiovascular: RRR, pulses present bilaterally  Respiratory: nonlabored breathing  Gastrointestinal: abdomen soft, ttp in RUQ, RLQ, periumbilical and suprapubic, non distended, no rebound or guarding, no palpable masses  Neuro: no focal deficits  Extremities: non edematous, no calf pain bilaterally     LABS:                        12.8   10.06 )-----------( 170      ( 2023 14:10 )             40.6         139  |  107  |  8   ----------------------------<  103<H>  4.2   |  26  |  0.79    Ca    9.0      2023 14:10    TPro  8.3  /  Alb  3.9  /  TBili  0.3  /  DBili  x   /  AST  29  /  ALT  48  /  AlkPhos  102        Urinalysis Basic - ( 2023 16:11 )    Color: Pale Yellow / Appearance: Clear / S.005 / pH: x  Gluc: x / Ketone: Trace  / Bili: Negative / Urobili: Negative   Blood: x / Protein: Negative / Nitrite: Negative   Leuk Esterase: Negative / RBC: x / WBC x   Sq Epi: x / Non Sq Epi: x / Bacteria: x        RADIOLOGY & ADDITIONAL STUDIES:  < from: CT Abdomen and Pelvis w/ IV Cont (23 @ 15:48) >  ACC: [id number] EXAM:  CT ABDOMEN AND PELVIS IC   ORDERED BY: NAKITA FERREIRA     PROCEDURE DATE:  2023          INTERPRETATION:  CLINICAL INFORMATION: Right upper and lower quadrant   abdominal pain. Nausea and vomiting. History of mycobacterium pelvis   infection on treatment x3 weeks.    COMPARISON: None.    CONTRAST/COMPLICATIONS:  IV Contrast: Omnipaque 350  90 cc administered   10 cc discarded  Oral Contrast: NONE  Complications: None reported at time of study completion    PROCEDURE:  CT of the Abdomen and Pelvis was performed.  Sagittal and coronal reformats were performed.    FINDINGS:  LOWER CHEST: Within normal limits.    LIVER: Within normal limits.  BILE DUCTS: Normal caliber.  GALLBLADDER: Within normal limits.  SPLEEN:Splenomegaly.  PANCREAS: Within normal limits.  ADRENALS: Within normal limits.  KIDNEYS/URETERS: 1.5 x 1.4 cm ill-defined cortical hypodensity in the   inferior pole left kidney (2:59).    BLADDER: Within normal limits.  REPRODUCTIVE ORGANS: Uterus and adnexa within normal limits.    BOWEL: 5 cm length irregular wall thickening/enhancement at the terminal   ileum and base of the cecum and surrounding pericolonic inflammation with   upstream mild small bowel dilatation up to 3.1 cm in diameter and   fecalization of distal bowel contents, consistent with partial small   bowel obstruction. Base of the appendix demonstrates similar inflammation   but distal appendix is otherwise normal.  PERITONEUM: Heterogeneous left subdiaphragmatic peritonealnodularity   measuring approximately 8.2 x 2.4 x 2.2 cm in conglomerate (2:24). Small   amount of perihepatic and pelvic ascites.  VESSELS: Within normal limits.  RETROPERITONEUM/LYMPH NODES: Right lower quadrant mesenteric   lymphadenopathy measuring up to 1.2 cm short axis (2:72).  ABDOMINAL WALL: Within normal limits.  BONES: Within normal limits.    IMPRESSION:  Partial small bowel obstruction due to irregular bowel wall thickening   and inflammation involving the terminal ileum, cecum and base of the   appendix. Differential considerations include infection (including   atypical/tuberculous infection given patient's history), inflammatory   bowel disease, or malignancy. Consider correlation with colonoscopy.    Heterogeneous left subdiaphragmatic peritoneal nodularity measuring up to   8.2 cm in conglomerate, which is favored to be infectious but peritoneal   metastases not excluded.    1.5 cm ill-defined hypodensity in the inferior pole left kidney, which   may represent infection and possibly same process as the bowel and   peritoneal processes.    Mild right lower quadrant mesenteric lymphadenopathy, which may be   reactive or metastatic.            --- End of Report ---            KIMANI HERNANDEZ MD; Attending Radiologist  This document has been electronically signed. 2023  5:04PM    < end of copied text >  
[facility]  Cornelio Miller PA-C  [address] [place] [id number]  [phone]      Chief Complaint:  Patient is a 38y old  Female who presents with a chief complaint of     HPI:37 y/o F PMHx of mycobacterium bovis infection on treatment x 3 weeks here complaining of abdominal pain nausea vomiting (NBNB).  Pain crampy and intermittent, no radiation of pain.  Pain started yesterday.  No meds taken for pain. Since 2023 patient has had pleuritic chest pain, night sweats, and cough and per patient 3 weeks ago she was dx w/ latent TB and subsequently informed it was active TB w/ positive sputum; admitted for 9 days at [facility] and started on anti-tb trx (ethambutol, rifampin, and moxifloxacin 2/2 rxn to INH), Follows with TB clinic at [facility]. Pt from [facility], moved to the [place] 2 [place] ago. Denies chest pain, palpitations, SOB, cough, diarrhea, constipation, urinary frequency, urgency, or dysuria, headaches, changes in vision, dizziness, numbness, tingling. Denies recent travel or sick contacts.     Allergies:  amoxicillin (Rash)      Medications:  acetaminophen     Tablet .. 650 milliGRAM(s) Oral every 6 hours PRN  albuterol    90 MICROgram(s) HFA Inhaler 2 Puff(s) Inhalation every 6 hours PRN  bisacodyl 5 milliGRAM(s) Oral daily PRN  ethambutol 1000 milliGRAM(s) Oral daily  melatonin 3 milliGRAM(s) Oral at bedtime PRN  morphine  - Injectable 2 milliGRAM(s) IV Push every 4 hours PRN  morphine  - Injectable 1 milliGRAM(s) IV Push every 4 hours PRN  moxifloxacin 400 milliGRAM(s) Oral daily  naloxone Injectable 0.4 milliGRAM(s) IV Push once  ondansetron Injectable 4 milliGRAM(s) IV Push every 4 hours PRN  polyethylene glycol 3350 17 Gram(s) Oral daily  pyridoxine 50 milliGRAM(s) Oral daily  rifAMPin 600 milliGRAM(s) Oral daily  senna 2 Tablet(s) Oral at bedtime      PMHX/PSHX:  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        Family history:      Social History:     ROS:     General:  no fevers, chills, night sweats, fatigue,   Eyes:  Good vision, no reported pain  ENT:  No sore throat, pain, runny nose, dysphagia  CV:  No pain, palpitations, hypo/hypertension  Resp:  No dyspnea, cough, tachypnea, wheezing  GI:  No pain, No nausea, No vomiting, No diarrhea, No constipation, No weight loss, No fever, No pruritis, No rectal bleeding, No tarry stools, No dysphagia,  :  No pain, bleeding, incontinence, nocturia  Muscle:  No pain, weakness  Neuro:  No weakness, tingling, memory problems  Psych:  No fatigue, insomnia, mood problems, depression  Endocrine:  No polyuria, polydipsia, cold/heat intolerance  Heme:  No petechiae, ecchymosis, easy bruisability  Skin:  No rash, tattoos, scars, edema      PHYSICAL EXAM:   Vital Signs:  Vital Signs Last 24 Hrs  T(C): 37.3 (2023 13:19), Max: 37.3 (2023 16:30)  T(F): 99.1 (2023 13:19), Max: 99.1 (2023 16:30)  HR: 95 (2023 13:19) (79 - 95)  BP: 108/64 (2023 13:19) (92/60 - 108/64)  BP(mean): --  RR: 18 (2023 13:19) (16 - 18)  SpO2: 96% (2023 13:19) (94% - 99%)    Parameters below as of 2023 13:19  Patient On (Oxygen Delivery Method): room air      Daily     Daily Weight in k.6 (2023 13:19)    GENERAL:  Appears stated age,   HEENT:  NC/AT,    CHEST:  Full & symmetric excursion,   HEART:  Regular rhythm  ABDOMEN:  Soft, non-tender, non-distended,   EXTEREMITIES:  no cyanosis,clubbing or edema  SKIN:  No rash  NEURO:  Alert,    LABS:                        12.4   8.78  )-----------( 165      ( 2023 06:20 )             38.4     04-    140  |  111<H>  |  10  ----------------------------<  105<H>  3.9   |  22  |  0.71    Ca    8.3<L>      2023 06:20    TPro  7.3  /  Alb  3.5  /  TBili  0.5  /  DBili  x   /  AST  28  /  ALT  44  /  AlkPhos  94      LIVER FUNCTIONS - ( 2023 06:20 )  Alb: 3.5 g/dL / Pro: 7.3 g/dL / ALK PHOS: 94 U/L / ALT: 44 U/L / AST: 28 U/L / GGT: x             Urinalysis Basic - ( 2023 16:11 )    Color: Pale Yellow / Appearance: Clear / S.005 / pH: x  Gluc: x / Ketone: Trace  / Bili: Negative / Urobili: Negative   Blood: x / Protein: Negative / Nitrite: Negative   Leuk Esterase: Negative / RBC: x / WBC x   Sq Epi: x / Non Sq Epi: x / Bacteria: x          Imaging:          
[facility]  INFECTIOUS DISEASES   [address] [place] [address]  Tel: [phone]     Fax: [phone]  ======================================================  MD Tana Zapata Kaushal, MD Cho, Michelle, MD   ======================================================    Assessment/Recommendations       38-year-old  female with past medical history of asthma with recently diagnosed with mycobacterium  bovis pulmonary infection on  antimycobacterial agents presented with nausea vomiting abdominal pain admitted to the hospital with partial small bowel obstruction with possible disseminated (ileal, cecal, peritoneal,  lymph deon)  mycobacterial bovis infection     patient seen and examined   normal WBC count   normal LFTs, trend while inpatient   significant  relief in symptoms with symptom management with IV fluid, antiemetics and pain management which  I recommend to continue further   recommend resumption of  antimycobacterial  agents as per patient's outpatient regimen including ethambutol 1 g daily, rifampin 600 mg daily, moxifloxacin 400 mg daily (plan discussed with clinical pharmacy and patient to take her own moxifloxacin after verification by pharmacy)   inherently resistant to pyrazinamide  ?  pericarditis with INH as per patient requiring short course colchicine    should try to obtain records from [facility] ( left a message to their office)   advance diet as tolerated   surgical team involved in the care     CT Abdomen and Pelvis w/ IV Cont (23 ): Partial small bowel obstruction due to irregular bowel wall thickening and inflammation involving the terminal ileum, cecum and base of the appendix. Differential considerations include infection (including   atypical/tuberculous infection given patient's history), inflammatory bowel disease, or malignancy. Consider correlation with colonoscopy. Heterogeneous left subdiaphragmatic peritoneal nodularity measuring up to  8.2 cm in conglomerate, which is favored to be infectious but peritoneal metastases not excluded. 1.5 cm ill-defined hypodensity in the inferior pole left kidney, which may represent infection and possibly same process as the bowel and peritoneal processes. Mild right lower quadrant mesenteric lymphadenopathy, which may be reactive or metastatic.      Records, reports from primary team, nursing, consultant reviewed  Plan explained to patient   Case discussed with Primary team   _________________________________________________-  Patient is a 38y old  Female who presents with a chief complaint of   HPI:  39 y/o F PMHx of mycobacterium bovis infection on treatment x 3 weeks here complaining of abdominal pain nausea vomiting (NBNB).  Pain crampy and intermittent, no radiation of pain.  Pain started yesterday.  No meds taken for pain. Since 2023 patient has had pleuritic chest pain, night sweats, and cough and per patient 3 weeks ago she was dx w/ latent TB and subsequently informed it was active TB w/ positive sputum; admitted for 9 days at [facility] and started on anti-tb trx (ethambutol, rifampin, and moxifloxacin 2/2 rxn to INH), Follows with TB clinic at [facility]. Pt from [facility], moved to the  2 years ago. Denies chest pain, palpitations, SOB, cough, diarrhea, constipation, urinary frequency, urgency, or dysuria, headaches, changes in vision, dizziness, numbness, tingling. Denies recent travel or sick contacts.     IN THE ED:  Temp  99.1 F , HR 91 , BP 92/60  ,RR 18 , SpO2 99% on RA  S/P 1L NS bolus x 2; Ofirmev, famotidine, ketorolac, Zofran x 2, Maalox x 1  Imaging:   CT A&P:   - Partial small bowel obstruction due to irregular bowel wall thickening and inflammation involving the terminal ileum, cecum and base of the appendix. Differential considerations include infection (including atypical/tuberculous infection given patient's history), inflammatory   bowel disease, or malignancy. Consider correlation with colonoscopy.  - Heterogeneous left subdiaphragmatic peritoneal nodularity measuring up to 8.2 cm in conglomerate, which is favored to be infectious but peritoneal metastases not excluded.  - 1.5 cm ill-defined hypodensity in the inferior pole left kidney, which may represent infection and possibly same process as the bowel and peritoneal processes.  - Mild right lower quadrant mesenteric lymphadenopathy, which may be reactive or metastatic.    RUQ U/S - Mild hepatomegaly. Question mild liver surface nodularity, raising the possibility of underlying cirrhosis. Unremarkable sonographic appearance of the gallbladder. No biliary duct dilation. Trace perihepatic ascites. (2023 19:52)    Infectious disease team consulted for further management ofPatient is complaints of nausea vomiting and abdominal pain significantly improved this morning with no further nausea or vomiting and much improved abdominal pain.  Passing gas.  Denied any constipation.  Denied any fever or chills.  Right-sided rib pain and pleuritic pain significantly improved over last 1 month still present and overall exertional dyspnea also significantly improved.  Denied any cough or phlegm production at this time.  Denied any weight loss or hemoptysis.  Right neck lump still present   but not painful.      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 650 milliGRAM(s) Oral every 6 hours PRN  albuterol    90 MICROgram(s) HFA Inhaler 2 Puff(s) Inhalation every 6 hours PRN  bisacodyl 5 milliGRAM(s) Oral daily PRN  melatonin 3 milliGRAM(s) Oral at bedtime PRN  morphine  - Injectable 2 milliGRAM(s) IV Push every 4 hours PRN  morphine  - Injectable 1 milliGRAM(s) IV Push every 4 hours PRN  naloxone Injectable 0.4 milliGRAM(s) IV Push once  ondansetron Injectable 4 milliGRAM(s) IV Push every 4 hours PRN  polyethylene glycol 3350 17 Gram(s) Oral daily  pyridoxine 50 milliGRAM(s) Oral daily  senna 2 Tablet(s) Oral at bedtime    ____________________________________________  Physical Examination:    ICU Vital Signs Last 24 Hrs  T(C): 37.1 (2023 08:26), Max: 37.3 (2023 16:30)  T(F): 98.7 (2023 08:26), Max: 99.1 (2023 16:30)  HR: 79 (2023 08:26) (79 - 91)  BP: 103/64 (2023 08:26) (92/60 - 103/68)  RR: 18 (2023 08:26) (16 - 18)  SpO2: 96% (2023 08:26) (94% - 99%)    O2 Parameters below as of 2023 08:26  Patient On (Oxygen Delivery Method): room air      General: No acute distress while lying in bed    Neuro: AAO*4, No obvious acute motor deficit  HEENT: Pupils equal, reactive to light, Oral mucosa moist,   PULM: Clear to auscultation bilaterally, no significant adventitious breath sounds   CVS: Regular rhythm and controlled rate  ABD: Soft, nondistended, RUQ, epigastric, umbilical, RLQ tenderness without guarding or rigidity , normoactive bowel sounds, no CVA tenderness  EXT: No b/l LE edema, nontender with pedal pulse palpable   SKIN: Warm and well perfused, no acute rashes   right side post SCM palpable non tender lymphadenopathy     _______________________________________________________    Lab Results:                       12.4   8.78  )-----------( 165      ( 2023 06:20 )             38.4     04-25    140  |  111<H>  |  10  ----------------------------<  105<H>  3.9   |  22  |  0.71    Ca    8.3<L>      2023 06:20    TPro  7.3  /  Alb  3.5  /  TBili  0.5  /  DBili  x   /  AST  28  /  ALT  44  /  AlkPhos  94  04-25    LIVER FUNCTIONS - ( 2023 06:20 )  Alb: 3.5 g/dL / Pro: 7.3 g/dL / ALK PHOS: 94 U/L / ALT: 44 U/L / AST: 28 U/L / GGT: x         Urinalysis Basic - ( 2023 16:11 )  Color: Pale Yellow / Appearance: Clear / S.005 / pH: x  Gluc: x / Ketone: Trace  / Bili: Negative / Urobili: Negative   Blood: x / Protein: Negative / Nitrite: Negative   Leuk Esterase: Negative / RBC: x / WBC x   Sq Epi: x / Non Sq Epi: x / Bacteria: x  MICROBIOLOGY/PATHOLOGY/ RADIOLOGY: Reviewed

## 2023-04-25 NOTE — CARE COORDINATION ASSESSMENT. - QUALITY OF FAMILY RELATIONSHIPS
Health Maintenance, Female  Adopting a healthy lifestyle and getting preventive care are important in promoting health and wellness. Ask your health care provider about:  · The right schedule for you to have regular tests and exams.  · Things you can do on your own to prevent diseases and keep yourself healthy.  What should I know about diet, weight, and exercise?  Eat a healthy diet    · Eat a diet that includes plenty of vegetables, fruits, low-fat dairy products, and lean protein.  · Do not eat a lot of foods that are high in solid fats, added sugars, or sodium.  Maintain a healthy weight  Body mass index (BMI) is used to identify weight problems. It estimates body fat based on height and weight. Your health care provider can help determine your BMI and help you achieve or maintain a healthy weight.  Get regular exercise  Get regular exercise. This is one of the most important things you can do for your health. Most adults should:  · Exercise for at least 150 minutes each week. The exercise should increase your heart rate and make you sweat (moderate-intensity exercise).  · Do strengthening exercises at least twice a week. This is in addition to the moderate-intensity exercise.  · Spend less time sitting. Even light physical activity can be beneficial.  Watch cholesterol and blood lipids  Have your blood tested for lipids and cholesterol at 20 years of age, then have this test every 5 years.  Have your cholesterol levels checked more often if:  · Your lipid or cholesterol levels are high.  · You are older than 40 years of age.  · You are at high risk for heart disease.  What should I know about cancer screening?  Depending on your health history and family history, you may need to have cancer screening at various ages. This may include screening for:  · Breast cancer.  · Cervical cancer.  · Colorectal cancer.  · Skin cancer.  · Lung cancer.  What should I know about heart disease, diabetes, and high blood  pressure?  Blood pressure and heart disease  · High blood pressure causes heart disease and increases the risk of stroke. This is more likely to develop in people who have high blood pressure readings, are of  descent, or are overweight.  · Have your blood pressure checked:  ? Every 3-5 years if you are 18-39 years of age.  ? Every year if you are 40 years old or older.  Diabetes  Have regular diabetes screenings. This checks your fasting blood sugar level. Have the screening done:  · Once every three years after age 40 if you are at a normal weight and have a low risk for diabetes.  · More often and at a younger age if you are overweight or have a high risk for diabetes.  What should I know about preventing infection?  Hepatitis B  If you have a higher risk for hepatitis B, you should be screened for this virus. Talk with your health care provider to find out if you are at risk for hepatitis B infection.  Hepatitis C  Testing is recommended for:  · Everyone born from 1945 through 1965.  · Anyone with known risk factors for hepatitis C.  Sexually transmitted infections (STIs)  · Get screened for STIs, including gonorrhea and chlamydia, if:  ? You are sexually active and are younger than 24 years of age.  ? You are older than 24 years of age and your health care provider tells you that you are at risk for this type of infection.  ? Your sexual activity has changed since you were last screened, and you are at increased risk for chlamydia or gonorrhea. Ask your health care provider if you are at risk.  · Ask your health care provider about whether you are at high risk for HIV. Your health care provider may recommend a prescription medicine to help prevent HIV infection. If you choose to take medicine to prevent HIV, you should first get tested for HIV. You should then be tested every 3 months for as long as you are taking the medicine.  Pregnancy  · If you are about to stop having your period (premenopausal) and  supportive you may become pregnant, seek counseling before you get pregnant.  · Take 400 to 800 micrograms (mcg) of folic acid every day if you become pregnant.  · Ask for birth control (contraception) if you want to prevent pregnancy.  Osteoporosis and menopause  Osteoporosis is a disease in which the bones lose minerals and strength with aging. This can result in bone fractures. If you are 65 years old or older, or if you are at risk for osteoporosis and fractures, ask your health care provider if you should:  · Be screened for bone loss.  · Take a calcium or vitamin D supplement to lower your risk of fractures.  · Be given hormone replacement therapy (HRT) to treat symptoms of menopause.  Follow these instructions at home:  Lifestyle  · Do not use any products that contain nicotine or tobacco, such as cigarettes, e-cigarettes, and chewing tobacco. If you need help quitting, ask your health care provider.  · Do not use street drugs.  · Do not share needles.  · Ask your health care provider for help if you need support or information about quitting drugs.  Alcohol use  · Do not drink alcohol if:  ? Your health care provider tells you not to drink.  ? You are pregnant, may be pregnant, or are planning to become pregnant.  · If you drink alcohol:  ? Limit how much you use to 0-1 drink a day.  ? Limit intake if you are breastfeeding.  · Be aware of how much alcohol is in your drink. In the [place] one drink equals one 12 oz bottle of beer (355 mL), one 5 oz glass of wine (148 mL), or one 1½ oz glass of hard liquor (44 mL).  General instructions  · Schedule regular health, dental, and eye exams.  · Stay current with your vaccines.  · Tell your health care provider if:  ? You often feel depressed.  ? You have ever been abused or do not feel safe at home.  Summary  · Adopting a healthy lifestyle and getting preventive care are important in promoting health and wellness.  · Follow your health care provider's instructions about healthy  diet, exercising, and getting tested or screened for diseases.  · Follow your health care provider's instructions on monitoring your cholesterol and blood pressure.  This information is not intended to replace advice given to you by your health care provider. Make sure you discuss any questions you have with your health care provider.  Document Released: 07/02/2012 Document Revised: 12/11/2019 Document Reviewed: 12/11/2019  Elsevier Patient Education © 2020 [employer]    Food Choices for Gastroesophageal Reflux Disease, Adult  When you have gastroesophageal reflux disease (GERD), the foods you eat and your eating habits are very important. Choosing the right foods can help ease your discomfort. Think about working with a nutrition specialist (dietitian) to help you make good choices.  What are tips for following this plan?    Meals  · Choose healthy foods that are low in fat, such as fruits, vegetables, whole grains, low-fat dairy products, and lean meat, fish, and poultry.  · Eat small meals often instead of 3 large meals a day. Eat your meals slowly, and in a place where you are relaxed. Avoid bending over or lying down until 2-3 hours after eating.  · Avoid eating meals 2-3 hours before bed.  · Avoid drinking a lot of liquid with meals.  · Cook foods using methods other than frying. Bake, grill, or broil food instead.  · Avoid or limit:  ? Chocolate.  ? Peppermint or spearmint.  ? Alcohol.  ? Pepper.  ? Black and decaffeinated coffee.  ? Black and decaffeinated tea.  ? Bubbly (carbonated) soft drinks.  ? Caffeinated energy drinks and soft drinks.  · Limit high-fat foods such as:  ? Fatty meat or fried foods.  ? Whole milk, cream, butter, or ice cream.  ? Nuts and nut butters.  ? Pastries, donuts, and sweets made with butter or shortening.  · Avoid foods that cause symptoms. These foods may be different for everyone. Common foods that cause symptoms include:  ? Tomatoes.  ? Oranges, primo, and  limes.  ? Peppers.  ? Spicy food.  ? Onions and garlic.  ? Vinegar.  Lifestyle  · Maintain a healthy weight. Ask your doctor what weight is healthy for you. If you need to lose weight, work with your doctor to do so safely.  · Exercise for at least 30 minutes for 5 or more days each week, or as told by your doctor.  · Wear loose-fitting clothes.  · Do not smoke. If you need help quitting, ask your doctor.  · Sleep with the head of your bed higher than your feet. Use a wedge under the mattress or blocks under the bed frame to raise the head of the bed.  Summary  · When you have gastroesophageal reflux disease (GERD), food and lifestyle choices are very important in easing your symptoms.  · Eat small meals often instead of 3 large meals a day. Eat your meals slowly, and in a place where you are relaxed.  · Limit high-fat foods such as fatty meat or fried foods.  · Avoid bending over or lying down until 2-3 hours after eating.  · Avoid peppermint and spearmint, caffeine, alcohol, and chocolate.  This information is not intended to replace advice given to you by your health care provider. Make sure you discuss any questions you have with your health care provider.  Document Released: 06/18/2013 Document Revised: 04/09/2020 Document Reviewed: 01/23/2018  Elsevier Patient Education © 2020 Elsevier Inc.    Preventive Care 21-39 Years Old, Female  Preventive care refers to visits with your health care provider and lifestyle choices that can promote health and wellness. This includes:  · A yearly physical exam. This may also be called an annual well check.  · Regular dental visits and eye exams.  · Immunizations.  · Screening for certain conditions.  · Healthy lifestyle choices, such as eating a healthy diet, getting regular exercise, not using drugs or products that contain nicotine and tobacco, and limiting alcohol use.  What can I expect for my preventive care visit?  Physical exam  Your health care provider will check  your:  · Height and weight. This may be used to calculate body mass index (BMI), which tells if you are at a healthy weight.  · Heart rate and blood pressure.  · Skin for abnormal spots.  Counseling  Your health care provider may ask you questions about your:  · Alcohol, tobacco, and drug use.  · Emotional well-being.  · Home and relationship well-being.  · Sexual activity.  · Eating habits.  · Work and work environment.  · Method of birth control.  · Menstrual cycle.  · Pregnancy history.  What immunizations do I need?    Influenza (flu) vaccine  · This is recommended every year.  Tetanus, diphtheria, and pertussis (Tdap) vaccine  · You may need a Td booster every 10 years.  Varicella (chickenpox) vaccine  · You may need this if you have not been vaccinated.  Human papillomavirus (HPV) vaccine  · If recommended by your health care provider, you may need three doses over 6 months.  Measles, mumps, and rubella (MMR) vaccine  · You may need at least one dose of MMR. You may also need a second dose.  Meningococcal conjugate (MenACWY) vaccine  · One dose is recommended if you are age 19-21 years and a first-year college student living in a residence ritter, or if you have one of several medical conditions. You may also need additional booster doses.  Pneumococcal conjugate (PCV13) vaccine  · You may need this if you have certain conditions and were not previously vaccinated.  Pneumococcal polysaccharide (PPSV23) vaccine  · You may need one or two doses if you smoke cigarettes or if you have certain conditions.  Hepatitis A vaccine  · You may need this if you have certain conditions or if you travel or work in places where you may be exposed to hepatitis A.  Hepatitis B vaccine  · You may need this if you have certain conditions or if you travel or work in places where you may be exposed to hepatitis B.  Haemophilus influenzae type b (Hib) vaccine  · You may need this if you have certain conditions.  You may receive  vaccines as individual doses or as more than one vaccine together in one shot (combination vaccines). Talk with your health care provider about the risks and benefits of combination vaccines.  What tests do I need?    Blood tests  · Lipid and cholesterol levels. These may be checked every 5 years starting at age 20.  · Hepatitis C test.  · Hepatitis B test.  Screening  · Diabetes screening. This is done by checking your blood sugar (glucose) after you have not eaten for a while (fasting).  · Sexually transmitted disease (STD) testing.  · BRCA-related cancer screening. This may be done if you have a family history of breast, ovarian, tubal, or peritoneal cancers.  · Pelvic exam and Pap test. This may be done every 3 years starting at age 21. Starting at age 30, this may be done every 5 years if you have a Pap test in combination with an HPV test.  Talk with your health care provider about your test results, treatment options, and if necessary, the need for more tests.  Follow these instructions at home:  Eating and drinking    · Eat a diet that includes fresh fruits and vegetables, whole grains, lean protein, and low-fat dairy.  · Take vitamin and mineral supplements as recommended by your health care provider.  · Do not drink alcohol if:  ? Your health care provider tells you not to drink.  ? You are pregnant, may be pregnant, or are planning to become pregnant.  · If you drink alcohol:  ? Limit how much you have to 0-1 drink a day.  ? Be aware of how much alcohol is in your drink. In the [place] one drink equals one 12 oz bottle of beer (355 mL), one 5 oz glass of wine (148 mL), or one 1½ oz glass of hard liquor (44 mL).  Lifestyle  · Take daily care of your teeth and gums.  · Stay active. Exercise for at least 30 minutes on 5 or more days each week.  · Do not use any products that contain nicotine or tobacco, such as cigarettes, e-cigarettes, and chewing tobacco. If you need help quitting, ask your health care  provider.  · If you are sexually active, practice safe sex. Use a condom or other form of birth control (contraception) in order to prevent pregnancy and STIs (sexually transmitted infections). If you plan to become pregnant, see your health care provider for a preconception visit.  What's next?  · Visit your health care provider once a year for a well check visit.  · Ask your health care provider how often you should have your eyes and teeth checked.  · Stay up to date on all vaccines.  This information is not intended to replace advice given to you by your health care provider. Make sure you discuss any questions you have with your health care provider.  Document Released: 02/13/2003 Document Revised: 08/29/2019 Document Reviewed: 08/29/2019  [employer] Patient Education © 2020 [employer]      Nutrition and activity goals reviewed including: mainly water to drink, limit white flour/processed sugar, and processed foods, choose fresh fruits, vegetables, fish, lean meats,high fiber carbs, exercise  working toward 150 mins cardio per week, weight training 2x/week.

## 2023-04-25 NOTE — PROGRESS NOTE ADULT - PROBLEM SELECTOR PLAN 1
Problem: Nutrition, Imbalanced: Inadequate Oral Intake (Adult)  Goal: Improved Oral Intake  Patient will demonstrate the desired outcomes by discharge/transition of care.  Intervention: carbohydrate/fat/and sodium modified diet + Nutrition supplement therapy-commercial beverage     Recommendation:   1)  Continue consistent carbohydrate, cardiac diet and (3-4 carb servings per meal)   2) Consider checking pt's a1C 2/2   3) Continue Boost Glucose Control, BID     Intervention:  Modified carbohydrate diet   Goals: 1) Pt will consume >=75% meals during admit  Nutrition Goal Status: Continues  Communication of RD Recs: (POC, sticky note, reviewed with RN)       Partial SBO  SBO likely 2/2 to abd TB/infx process  NPO except meds  May advance diet as tolerated.  GI consult, f/u recs  Surgery consulted Dr. Quijano, recs appreciated  pSBO likely secondary to inflammation from infectious process, making patient a poor candidate for surgery/low indication for necessity

## 2023-04-25 NOTE — ED ADULT NURSE REASSESSMENT NOTE - NS ED NURSE REASSESS COMMENT FT1
Received patient for continuum of care, patient admitted pending bed placement. No concern voiced at this time. Will continue to monitor

## 2023-04-25 NOTE — CONSULT NOTE ADULT - ASSESSMENT
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    Plan:  - Medicine admit  - GI consult  - Conservative management  - low threshold to place NGT if patient has another episode of emesis  - Continue TB medications  - pSBO likely secondary to inflammation from infectious process, making patient a poor candidate for surgery/low indication for necessity   - D/w Dr. Quijano 
abnormal CT   abdominal pain    cont clears today  cont antibiotics per ID  doubt colonoscopy will   outpatient follow up prn with [facility] after d/c

## 2023-04-25 NOTE — CONSULT NOTE ADULT - TIME BILLING
Thank you for your consult.   Please call us with any concerns or questions.   We will continue to follow.     Carlos Fajardo MD   Division of Infectious Diseases  [facility]   Cell [phone] between 8am and 6pm   After 6pm and weekends please call ID service at [phone].

## 2023-04-25 NOTE — CHART NOTE - NSCHARTNOTEFT_GEN_A_CORE
Called by RN for patient complaining of nausea. Patient seen and examined at bedside. Pt states she still has nausea at this time after being given 3 doses of zofran (last dose at ~2 hours ago). Pt also admits to abdominal pain but is improving after being given morphine. Pt currently denies fever, chills, CP, SOB, vomiting, diarrhea, constipation.      T(C): 37 (04-24-23 @ 22:41), Max: 37.3 (04-24-23 @ 16:30)  HR: 86 (04-24-23 @ 22:41) (80 - 91)  BP: 97/60 (04-24-23 @ 22:41) (92/60 - 103/68)  RR: 18 (04-24-23 @ 22:41) (16 - 18)  SpO2: 94% (04-24-23 @ 22:41) (94% - 99%)  Wt(kg): --    Physical Exam:  Gen: well appearing, NAD  HEENT: NCAT, PEERLA b/l, EOMI b/l, no conjunctival erythema  Cardio: regular rate and rhythm, +s1s2, no murmurs, rubs, or gallops  Pulm: CTA b/l, no wheezes, rales or rhonchi  Abdomen: Soft, TTP epigastric/R abd, Bowel sounds normoactive, no guarding  Extremities: no clubbing, cyanosis or edema, +2 pedal pulses  Neuro: AAOx3, moving all 4 extremities, sensation intact  Skin: warm and dry      Assessment/Plan  37 y/o F PMHx of mycobacterium bovis infection on treatment x3 weeks here complaining of abdominal pain nausea vomiting (NBNB). Admit for SBO likely 2/2 abdominal mycobacteria bovis. Called for nausea    - abdominal exam unchanged from prior  - pt with nausea unresponsive to zofran x3  - nausea likely 2/2 SBO  - ECG: NSR, QTc 446  - ordered Reglan 10mg IVP x1  - RN to call if changes Called by RN for patient complaining of nausea. Patient seen and examined at bedside. Pt states she still has nausea at this time with associated NBNB vomiting after being given 3 doses of zofran (last dose at ~2 hours ago). Pt also admits to abdominal pain but is improving after being given morphine. Pt currently denies fever, chills, CP, SOB, diarrhea, constipation.      T(C): 37 (04-24-23 @ 22:41), Max: 37.3 (04-24-23 @ 16:30)  HR: 86 (04-24-23 @ 22:41) (80 - 91)  BP: 97/60 (04-24-23 @ 22:41) (92/60 - 103/68)  RR: 18 (04-24-23 @ 22:41) (16 - 18)  SpO2: 94% (04-24-23 @ 22:41) (94% - 99%)  Wt(kg): --    Physical Exam:  Gen: well appearing, NAD  HEENT: NCAT, PEERLA b/l, EOMI b/l, no conjunctival erythema  Cardio: regular rate and rhythm, +s1s2, no murmurs, rubs, or gallops  Pulm: CTA b/l, no wheezes, rales or rhonchi  Abdomen: Soft, TTP epigastric/R abd, Bowel sounds normoactive, no guarding  Extremities: no clubbing, cyanosis or edema, +2 pedal pulses  Neuro: AAOx3, moving all 4 extremities, sensation intact  Skin: warm and dry      Assessment/Plan  37 y/o F PMHx of mycobacterium bovis infection on treatment x3 weeks here complaining of abdominal pain nausea vomiting (NBNB). Admit for SBO likely 2/2 abdominal mycobacteria bovis. Called for nausea    - abdominal exam unchanged from prior  - pt with nausea unresponsive to zofran x3  - nausea likely 2/2 SBO  - ECG: NSR, QTc 446  - ordered Reglan 10mg IVP x1  - RN to call if changes Called by RN for patient complaining of nausea. Patient seen and examined at bedside. Pt states she still has nausea at this time with associated NBNB vomiting after being given 3 doses of zofran (last dose at ~2 hours ago). Pt also admits to abdominal pain but is improving after being given morphine. Pt currently denies fever, chills, CP, SOB, diarrhea, constipation.      T(C): 37 (04-24-23 @ 22:41), Max: 37.3 (04-24-23 @ 16:30)  HR: 86 (04-24-23 @ 22:41) (80 - 91)  BP: 97/60 (04-24-23 @ 22:41) (92/60 - 103/68)  RR: 18 (04-24-23 @ 22:41) (16 - 18)  SpO2: 94% (04-24-23 @ 22:41) (94% - 99%)  Wt(kg): --    Physical Exam:  Gen: ill appearing, NAD  HEENT: NCAT, PEERLA b/l, EOMI b/l, no conjunctival erythema  Cardio: regular rate and rhythm, +s1s2, no murmurs, rubs, or gallops  Pulm: CTA b/l, no wheezes, rales or rhonchi  Abdomen: Soft, TTP epigastric/R abd, Bowel sounds normoactive, no guarding  Extremities: no clubbing, cyanosis or edema, +2 pedal pulses  Neuro: AAOx3, moving all 4 extremities, sensation intact  Skin: warm and dry      Assessment/Plan  37 y/o F PMHx of mycobacterium bovis infection on treatment x3 weeks here complaining of abdominal pain nausea vomiting (NBNB). Admit for SBO likely 2/2 abdominal mycobacteria bovis. Called for nausea    - abdominal exam unchanged from prior  - pt with nausea unresponsive to zofran x3  - nausea likely 2/2 SBO  - ECG: NSR, QTc 446  - ordered Reglan 10mg IVP x1  - RN to call if changes

## 2023-04-25 NOTE — PROGRESS NOTE ADULT - PROBLEM SELECTOR PLAN 3
SCDs and ambulation for DVT prophylaxis pending possible though unlikely OR        #Medication mgmt  Relvar inhaler - family should be notified to bring from home if used daily, not on formulary  Moxifloxacin -  aware and will bring to hospital

## 2023-04-25 NOTE — PROGRESS NOTE ADULT - PROBLEM SELECTOR PLAN 2
Pulmonary w/ concern for abdominal extension based on CT imaging  CT A&P: Heterogeneous left subdiaphragmatic peritoneal nodularity measuring up to 8.2 cm in conglomerate. 1.5 cm ill-defined hypodensity in the inferior pole left kidney, which may represent infection and possibly same process as the bowel and peritoneal processes. Mild right lower quadrant mesenteric lymphadenopathy, which may be reactive or metastatic.  Continue ethambutol, rifampin, moxifloxacin (pt  to bring from home), and vit b6  Out of isolation period  ID consulted, f/u recs  GI consult, f/u recs

## 2023-04-25 NOTE — CARE COORDINATION ASSESSMENT. - NSCAREPROVIDERS_GEN_ALL_CORE_FT
CARE PROVIDERS:  Accepting Physician: Jr Morrell  Administration: Daphne Lemos  Administration: Juliann Santana  Admitting: Jr Morrell  Attending: Jr Morrell  Case Management: Santa Ward  Case Management: Kaz Taveras  Consultant: Maria Esther Quijano  Consultant: Carlos Fajardo  Consultant: Odalis Bella  Consultant: Shay Lovell  Consultant: Andre Powers  Covering Team: Oswald Sparks  ED Attending: Trina Ramírez  ED Nurse: Carlos Rosales  Nurse: Zink, Corinne  Nurse: Jesus Hernandez  Nurse: Haylee Dia  Nurse: Cherie Zapata  Nurse: Steffi Burgess  Nurse: Tracy Beal  Ordered: ADM, User  PCA/Nursing Assistant: Charlee Barnett  Physical Therapy: Rin Canada  Primary Team: Rex Wagoner  Primary Team: Nikolas Morel  Primary Team: Mary Zayas  Primary Team: Oswald Rivas  Primary Team: Mary Diaz  Primary Team: Che Todd  Respiratory Therapy: Enzo Henriquez

## 2023-04-26 ENCOUNTER — TRANSCRIPTION ENCOUNTER (OUTPATIENT)
Age: 38
End: 2023-04-26

## 2023-04-26 VITALS
DIASTOLIC BLOOD PRESSURE: 65 MMHG | SYSTOLIC BLOOD PRESSURE: 100 MMHG | HEART RATE: 82 BPM | RESPIRATION RATE: 17 BRPM | TEMPERATURE: 99 F | OXYGEN SATURATION: 95 %

## 2023-04-26 LAB
ANION GAP SERPL CALC-SCNC: 6 MMOL/L — SIGNIFICANT CHANGE UP (ref 5–17)
BUN SERPL-MCNC: 9 MG/DL — SIGNIFICANT CHANGE UP (ref 7–23)
CALCIUM SERPL-MCNC: 8.3 MG/DL — LOW (ref 8.5–10.1)
CHLORIDE SERPL-SCNC: 111 MMOL/L — HIGH (ref 96–108)
CO2 SERPL-SCNC: 24 MMOL/L — SIGNIFICANT CHANGE UP (ref 22–31)
CREAT SERPL-MCNC: 0.79 MG/DL — SIGNIFICANT CHANGE UP (ref 0.5–1.3)
EGFR: 98 ML/MIN/1.73M2 — SIGNIFICANT CHANGE UP
GLUCOSE SERPL-MCNC: 89 MG/DL — SIGNIFICANT CHANGE UP (ref 70–99)
HCT VFR BLD CALC: 37.7 % — SIGNIFICANT CHANGE UP (ref 34.5–45)
HGB BLD-MCNC: 12.3 G/DL — SIGNIFICANT CHANGE UP (ref 11.5–15.5)
MCHC RBC-ENTMCNC: 26.3 PG — LOW (ref 27–34)
MCHC RBC-ENTMCNC: 32.6 GM/DL — SIGNIFICANT CHANGE UP (ref 32–36)
MCV RBC AUTO: 80.6 FL — SIGNIFICANT CHANGE UP (ref 80–100)
NRBC # BLD: 0 /100 WBCS — SIGNIFICANT CHANGE UP (ref 0–0)
PLATELET # BLD AUTO: 156 K/UL — SIGNIFICANT CHANGE UP (ref 150–400)
POTASSIUM SERPL-MCNC: 3.8 MMOL/L — SIGNIFICANT CHANGE UP (ref 3.5–5.3)
POTASSIUM SERPL-SCNC: 3.8 MMOL/L — SIGNIFICANT CHANGE UP (ref 3.5–5.3)
RBC # BLD: 4.68 M/UL — SIGNIFICANT CHANGE UP (ref 3.8–5.2)
RBC # FLD: 17.1 % — HIGH (ref 10.3–14.5)
SODIUM SERPL-SCNC: 141 MMOL/L — SIGNIFICANT CHANGE UP (ref 135–145)
WBC # BLD: 5.17 K/UL — SIGNIFICANT CHANGE UP (ref 3.8–10.5)
WBC # FLD AUTO: 5.17 K/UL — SIGNIFICANT CHANGE UP (ref 3.8–10.5)

## 2023-04-26 PROCEDURE — 74177 CT ABD & PELVIS W/CONTRAST: CPT | Mod: MA

## 2023-04-26 PROCEDURE — 85027 COMPLETE CBC AUTOMATED: CPT

## 2023-04-26 PROCEDURE — 80053 COMPREHEN METABOLIC PANEL: CPT

## 2023-04-26 PROCEDURE — 36415 COLL VENOUS BLD VENIPUNCTURE: CPT

## 2023-04-26 PROCEDURE — 81003 URINALYSIS AUTO W/O SCOPE: CPT

## 2023-04-26 PROCEDURE — 96374 THER/PROPH/DIAG INJ IV PUSH: CPT

## 2023-04-26 PROCEDURE — 99233 SBSQ HOSP IP/OBS HIGH 50: CPT

## 2023-04-26 PROCEDURE — 83690 ASSAY OF LIPASE: CPT

## 2023-04-26 PROCEDURE — 84702 CHORIONIC GONADOTROPIN TEST: CPT

## 2023-04-26 PROCEDURE — 99285 EMERGENCY DEPT VISIT HI MDM: CPT

## 2023-04-26 PROCEDURE — 99239 HOSP IP/OBS DSCHRG MGMT >30: CPT

## 2023-04-26 PROCEDURE — 80048 BASIC METABOLIC PNL TOTAL CA: CPT

## 2023-04-26 PROCEDURE — 93005 ELECTROCARDIOGRAM TRACING: CPT

## 2023-04-26 PROCEDURE — 76705 ECHO EXAM OF ABDOMEN: CPT

## 2023-04-26 PROCEDURE — 85025 COMPLETE CBC W/AUTO DIFF WBC: CPT

## 2023-04-26 PROCEDURE — 96375 TX/PRO/DX INJ NEW DRUG ADDON: CPT

## 2023-04-26 RX ORDER — ACETAMINOPHEN 500 MG
2 TABLET ORAL
Qty: 0 | Refills: 0 | DISCHARGE
Start: 2023-04-26

## 2023-04-26 RX ORDER — RIFAMPIN 300 MG
2 CAPSULE ORAL
Qty: 0 | Refills: 0 | DISCHARGE
Start: 2023-04-26

## 2023-04-26 RX ORDER — ETHAMBUTOL HYDROCHLORIDE 400 MG/1
10 TABLET, FILM COATED ORAL
Qty: 0 | Refills: 0 | DISCHARGE
Start: 2023-04-26

## 2023-04-26 RX ORDER — POLYETHYLENE GLYCOL 3350 17 G/17G
17 POWDER, FOR SOLUTION ORAL
Qty: 0 | Refills: 0 | DISCHARGE
Start: 2023-04-26

## 2023-04-26 RX ORDER — RIFAMPIN 300 MG
2 CAPSULE ORAL
Refills: 0 | DISCHARGE

## 2023-04-26 RX ORDER — ETHAMBUTOL HYDROCHLORIDE 400 MG/1
2 TABLET, FILM COATED ORAL
Refills: 0 | DISCHARGE

## 2023-04-26 RX ADMIN — Medication 50 MILLIGRAM(S): at 11:30

## 2023-04-26 RX ADMIN — ETHAMBUTOL HYDROCHLORIDE 1000 MILLIGRAM(S): 400 TABLET, FILM COATED ORAL at 11:30

## 2023-04-26 RX ADMIN — MOXIFLOXACIN HYDROCHLORIDE TABLETS, 400 MG 400 MILLIGRAM(S): 400 TABLET, FILM COATED ORAL at 11:30

## 2023-04-26 NOTE — DISCHARGE NOTE PROVIDER - CARE PROVIDER_API CALL
[facility],   [address] [place] [id number]  Phone: [phone]  Fax: (   )    -  Follow Up Time: 1 week

## 2023-04-26 NOTE — PROGRESS NOTE ADULT - TIME BILLING
Obtaining history/physical exam, reviewing labs and imaging studies, coordinating care with multidisciplinary team and nursing staff and discussing patient with surgical PA covering. Greater than 50% of the time was spent with direct face to face patient interaction, discussing with the patient and family, and answering all questions.
Reviewing medical record including consultant recommendations, labs, vitals, medications, orders, imaging, and discussion of plan of care with patient, family, consultants, and nursing.
Obtaining history/physical exam, reviewing labs and imaging studies, coordinating care with multidisciplinary team and nursing staff and discussing patient with surgical PA covering. Greater than 50% of the time was spent with direct face to face patient interaction, discussing with the patient and family, and answering all questions.

## 2023-04-26 NOTE — DISCHARGE NOTE NURSING/CASE MANAGEMENT/SOCIAL WORK - NSDCPEFALRISK_GEN_ALL_CORE
For information on Fall & Injury Prevention, visit: [email or web address] OR  [email or web address] OR  https://www.cdc.gov/steadi/patient.html

## 2023-04-26 NOTE — DISCHARGE NOTE NURSING/CASE MANAGEMENT/SOCIAL WORK - NSDCFUADDAPPT_GEN_ALL_CORE_FT
Please call and make an appointment with [facility] for follow up with Gastroenterology.

## 2023-04-26 NOTE — DISCHARGE NOTE PROVIDER - NSDCMRMEDTOKEN_GEN_ALL_CORE_FT
Albuterol (Eqv-ProAir HFA) 90 mcg/inh inhalation aerosol: 2 inhaler(s) inhaled as needed for  shortness of breath and/or wheezing  ethambutol 100 mg oral tablet: 2 tab(s) orally once a day  ethambutol 400 mg oral tablet: 2 tab(s) orally once a day  moxifloxacin 400 mg oral tablet: 1 tab(s) orally once a day  rifAMPin 300 mg oral capsule: 2 cap(s) orally once a day  Vitamin B6 50 mg oral tablet: 1 tab(s) orally once a day   acetaminophen 325 mg oral tablet: 2 tab(s) orally every 6 hours As needed Temp greater or equal to 38C (100.4F), Mild Pain (1 - 3)  Albuterol (Eqv-ProAir HFA) 90 mcg/inh inhalation aerosol: 2 puff(s) inhaled every 6 hours as needed for  shortness of breath and/or wheezing  ethambutol 100 mg oral tablet: 10 tab(s) orally once a day  moxifloxacin 400 mg oral tablet: 1 tab(s) orally once a day  polyethylene glycol 3350 oral powder for reconstitution: 17 gram(s) orally once a day  rifAMPin 300 mg oral capsule: 2 cap(s) orally once a day  Vitamin B6 50 mg oral tablet: 1 tab(s) orally once a day

## 2023-04-26 NOTE — DISCHARGE NOTE PROVIDER - ATTENDING DISCHARGE PHYSICAL EXAMINATION:
General: sitting in bed comfortably, NAD  HEENT: NCAT, PERRL, EOMI bl, moist mucous membranes   Neurology: A&Ox3, nonfocal, CN II-XII grossly intact  Respiratory: CTA B/L, No W/R/R  CV: RRR, +S1/S2, no murmurs, rubs or gallops  Abdominal: Soft, NT, ND +BSx4  Extremities: No C/C/E, + peripheral pulses  Skin: warm, dry

## 2023-04-26 NOTE — DISCHARGE NOTE PROVIDER - PROVIDER TOKENS
FREE:[LAST:[[facility]],PHONE:[phone]],FAX:[(   )    -],ADDRESS:[[address] [place] [address]],FOLLOWUP:[1 week]]

## 2023-04-26 NOTE — DISCHARGE NOTE PROVIDER - NSDCFUADDAPPT_GEN_ALL_CORE_FT
Please call and make an appointment with [facility] for follow up with Gastroenterology.

## 2023-04-26 NOTE — DISCHARGE NOTE PROVIDER - NSDCCPCAREPLAN_GEN_ALL_CORE_FT
PRINCIPAL DISCHARGE DIAGNOSIS  Diagnosis: Small bowel obstruction  Assessment and Plan of Treatment: You preseneted with c/o abdominal pain, nausea and vomiting. On admission we found that you have a partial small bowel obstruction. You were kept without solid foods for two days to help resolve it. Nause and vomiting on admission have resolved as well. Please keep an adequate diet.      SECONDARY DISCHARGE DIAGNOSES  Diagnosis: Nausea and vomiting  Assessment and Plan of Treatment: On admission you presented with nausea and vomiting. These symptoms have resolved. If they return or persist, and are intractable, please go to the nearest emergency room for treatment and management.    Diagnosis: MTB (Mycobacterium tuberculosis infection)  Assessment and Plan of Treatment: You were recently diagnosed with tuberculosis infection 3 weeks ago. You were started on oral antibiotics.   Take your medicine with a glass of water or food as told by your doctor.  Take the medicine as told. Finish them even if you start to feel better.  Do not give your medicine to other people.  Do not use your medicine in the future for a different infection.  Ask your doctor about which side effects to watch for.  Try not to miss any doses. If you miss a dose, take it as soon as possible       PRINCIPAL DISCHARGE DIAGNOSIS  Diagnosis: Small bowel obstruction  Assessment and Plan of Treatment: You presented with c/o abdominal pain, nausea and vomiting. On admission we found that you have a partial small bowel obstruction. You were kept without solid foods for two days to help resolve it. Nausea and vomiting on admission have resolved as well. Your diet was advanced. Please keep a low fiber diet.      SECONDARY DISCHARGE DIAGNOSES  Diagnosis: Nausea and vomiting  Assessment and Plan of Treatment: On admission you presented with nausea and vomiting. These symptoms have resolved. If they return or persist, and are intractable, please go to the nearest emergency room for treatment and management.    Diagnosis: MTB (Mycobacterium tuberculosis infection)  Assessment and Plan of Treatment: You were recently diagnosed with tuberculosis infection 3 weeks ago. You were started on oral antibiotics.   Take your medicine with a glass of water or food as told by your doctor.  Take the medicine as told. Finish them even if you start to feel better.  Do not give your medicine to other people.  Do not use your medicine in the future for a different infection.  Ask your doctor about which side effects to watch for.  Try not to miss any doses. If you miss a dose, take it as soon as possible

## 2023-04-26 NOTE — PROGRESS NOTE ADULT - NS ATTEND AMEND GEN_ALL_CORE FT
I have personally seen and examined the patient.  I fully participated in the care of this patient.  I have made amendments to the documentation where necessary, and agree with the history, physical exam, impression/assessment, and plan as documented by the PA    Tolerating diet  No nausea, continues to have GI function  Notes some more bloating  Abd softly distended, nontender    SBO clinically improving  -advance diet as tolerated  -abdominal exams  -monitor GI function  -continue tx of TB per ID  -SBO likely 2/2 TB given findings on CT
I have personally seen and examined the patient.  I fully participated in the care of this patient.  I have made amendments to the documentation where necessary, and agree with the history, physical exam, impression/assessment, and plan as documented by the PA    Patient reports obstructive symptoms now improving, passed flatus and had BM today. Nausea now resolved.  On tx for TB for past 3 weeks - likely cause of peritoneal process and partial SBO  Abd soft, mildly distended, nontender  Labs and vitals reviewed  CT imaging reviewed    pSBO clinically improving  -advance to clears  -abdominal exams  -monitor GI function  -no indication for surgical intervention    TB  -continue tx per ID  -not on isolation per ID    d/w patient and all questions answered

## 2023-04-26 NOTE — DISCHARGE NOTE NURSING/CASE MANAGEMENT/SOCIAL WORK - PATIENT PORTAL LINK FT
You can access the FollowMyHealth Patient Portal offered by [facility] by registering at the following website: [email or web address]. By joining [employer]’s FollowMyHealth portal, you will also be able to view your health information using other applications (apps) compatible with our system.

## 2023-04-26 NOTE — DISCHARGE NOTE PROVIDER - HOSPITAL COURSE
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  Pain crampy and intermittent, no radiation of pain.  Pain started yesterday.  No meds taken for pain. Since November of 2023 patient has had pleuritic chest pain, night sweats, and cough and per patient 3 weeks ago she was dx w/ latent TB and subsequently informed it was active TB w/ positive sputum; admitted for 9 days at [facility] and started on anti-tb trx (ethambutol, rifampin, and moxifloxacin 2/2 rxn to [facility]), Follows with TB clinic at [facility]. Pt from [facility], [place] 2 [place]. Denies chest pain, palpitations, SOB, cough, diarrhea, constipation, urinary frequency, urgency, or dysuria, headaches, changes in vision, dizziness, numbness, tingling. Denies recent travel or sick contacts.     IN THE ED:  Temp  99.1 F , HR 91 , BP 92/60  ,RR 18 , SpO2 99% on RA  S/P 1L NS bolus x 2; Ofirmev, famotidine, ketorolac, Zofran x 2, Maalox x 1  Imaging:   CT A&P:   - Partial small bowel obstruction due to irregular bowel wall thickening and inflammation involving the terminal ileum, cecum and base of the appendix. Differential considerations include infection (including atypical/tuberculous infection given patient's history), inflammatory   bowel disease, or malignancy. Consider correlation with colonoscopy.  - Heterogeneous left subdiaphragmatic peritoneal nodularity measuring up to 8.2 cm in conglomerate, which is favored to be infectious but peritoneal metastases not excluded.  - 1.5 cm ill-defined hypodensity in the inferior pole left kidney, which may represent infection and possibly same process as the bowel and peritoneal processes.  - Mild right lower quadrant mesenteric lymphadenopathy, which may be reactive or metastatic.    RUQ U/S - Mild hepatomegaly. Question mild liver surface nodularity, raising the possibility of underlying cirrhosis. Unremarkable sonographic appearance of the gallbladder. No biliary duct dilation. Trace perihepatic ascites. (24 Apr 2023 19:52)      ---  HOSPITAL COURSE: Patient admitted to medicine floor for management of abdominal pain. In ED studies CT Abd showed partial SBO, AMY/ATN     Pt seen and examined on day of discharge. Patient is medically optimized for discharge to home with close outpatient followup.    PHYSICAL EXAM ON DAY OF DISCHARGE:    General: No distress. Comfortable.  HEENT: EOM intact.  Neck: Neck supple. JVP not elevated. No masses  Chest: Clear to auscultation bilaterally  CV: s1 s2 RRR no murmurs, rubs or gallops   Abdomen: Soft, non-distended, non-tender  Extremity : + PP no c/c/e   Skin: No rashes or skin breakdown  Neurology: Alert and oriented times three. Sensation intact  Psych: Affect normal    ---  CONSULTANTS:     ---  TIME SPENT:  I, the attending physician, was physically present for the key portions of the evaluation and management (E/M) service provided. 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  Pain crampy and intermittent, no radiation of pain.  Pain started yesterday.  No meds taken for pain. Since November of 2023 patient has had pleuritic chest pain, night sweats, and cough and per patient 3 weeks ago she was dx w/ latent TB and subsequently informed it was active TB w/ positive sputum; admitted for 9 days at [facility] and started on anti-tb trx (ethambutol, rifampin, and moxifloxacin 2/2 rxn to [facility]), Follows with TB clinic at [facility]. Pt from [facility], [place] ago. Denies chest pain, palpitations, SOB, cough, diarrhea, constipation, urinary frequency, urgency, or dysuria, headaches, changes in vision, dizziness, numbness, tingling. Denies recent travel or sick contacts.     IN THE ED:  Temp  99.1 F , HR 91 , BP 92/60  ,RR 18 , SpO2 99% on RA  S/P 1L NS bolus x 2; Ofirmev, famotidine, ketorolac, Zofran x 2, Maalox x 1  Imaging:   CT A&P:   - Partial small bowel obstruction due to irregular bowel wall thickening and inflammation involving the terminal ileum, cecum and base of the appendix. Differential considerations include infection (including atypical/tuberculous infection given patient's history), inflammatory   bowel disease, or malignancy. Consider correlation with colonoscopy.  - Heterogeneous left subdiaphragmatic peritoneal nodularity measuring up to 8.2 cm in conglomerate, which is favored to be infectious but peritoneal metastases not excluded.  - 1.5 cm ill-defined hypodensity in the inferior pole left kidney, which may represent infection and possibly same process as the bowel and peritoneal processes.  - Mild right lower quadrant mesenteric lymphadenopathy, which may be reactive or metastatic.    RUQ U/S - Mild hepatomegaly. Question mild liver surface nodularity, raising the possibility of underlying cirrhosis. Unremarkable sonographic appearance of the gallbladder. No biliary duct dilation. Trace perihepatic ascites. (24 Apr 2023 19:52)      ---  HOSPITAL COURSE: Patient admitted to medicine floor for management of abdominal pain, N/V. In ED studies CT Abd showed partial SBO, AMY/ATN which have now resolved. You were recently diagnosed with outpatient TB infection 3 weeks on treatment, medications continued during admission. N /V have resolved, diet advanced. Patient now optimized for discharge with outpatient follow up with gastroenterology at [facility] clinic (patient currently a patient there).     Pt seen and examined on day of discharge. Patient is medically optimized for discharge to home with close outpatient followup.    PHYSICAL EXAM ON DAY OF DISCHARGE:    General: No distress. Comfortable.  HEENT: EOM intact.  Neck: Neck supple. JVP not elevated. No masses  Chest: Clear to auscultation bilaterally  CV: s1 s2 RRR no murmurs, rubs or gallops   Abdomen: Soft, non-distended, non-tender  Extremity : + PP no c/c/e   Skin: No rashes or skin breakdown  Neurology: Alert and oriented times three. Sensation intact  Psych: Affect normal    ---  CONSULTANTS:     ---  TIME SPENT:  I, the attending physician, was physically present for the key portions of the evaluation and management (E/M) service provided. 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  Pain crampy and intermittent, no radiation of pain.  Pain started yesterday.  No meds taken for pain. Since November of 2023 patient has had pleuritic chest pain, night sweats, and cough and per patient 3 weeks ago she was dx w/ latent TB and subsequently informed it was active TB w/ positive sputum; admitted for 9 days at [facility] and started on anti-tb trx (ethambutol, rifampin, and moxifloxacin 2/2 rxn to [facility]), Follows with TB clinic at [facility]. Pt from [facility], [place] 2 [place] ago. Denies chest pain, palpitations, SOB, cough, diarrhea, constipation, urinary frequency, urgency, or dysuria, headaches, changes in vision, dizziness, numbness, tingling. Denies recent travel or sick contacts.     IN THE ED:  Temp  99.1 F , HR 91 , BP 92/60  ,RR 18 , SpO2 99% on RA  S/P 1L NS bolus x 2; Ofirmev, famotidine, ketorolac, Zofran x 2, Maalox x 1  Imaging:   CT A&P:   - Partial small bowel obstruction due to irregular bowel wall thickening and inflammation involving the terminal ileum, cecum and base of the appendix. Differential considerations include infection (including atypical/tuberculous infection given patient's history), inflammatory   bowel disease, or malignancy. Consider correlation with colonoscopy.  - Heterogeneous left subdiaphragmatic peritoneal nodularity measuring up to 8.2 cm in conglomerate, which is favored to be infectious but peritoneal metastases not excluded.  - 1.5 cm ill-defined hypodensity in the inferior pole left kidney, which may represent infection and possibly same process as the bowel and peritoneal processes.  - Mild right lower quadrant mesenteric lymphadenopathy, which may be reactive or metastatic.    RUQ U/S - Mild hepatomegaly. Question mild liver surface nodularity, raising the possibility of underlying cirrhosis. Unremarkable sonographic appearance of the gallbladder. No biliary duct dilation. Trace perihepatic ascites. (24 Apr 2023 19:52)      ---  HOSPITAL COURSE: Patient admitted to medicine floor for management of abdominal pain, N/V. In ED studies CT Abd showed partial SBO, AMY/ATN which have now resolved. You were seen by infectious disease, surgery and gastroenterology; you did not require any surgical/gastroenterology and/or antibiotics for these findings. You were recently diagnosed with outpatient TB infection 3 weeks on treatment, medications continued during admission. N /V have resolved, diet advanced. Patient now optimized for discharge with outpatient follow up with gastroenterology at [facility] clinic (patient currently a patient there).     Pt seen and examined on day of discharge. Patient is medically optimized for discharge to home with close outpatient followup.    PHYSICAL EXAM ON DAY OF DISCHARGE:    General: No distress. Comfortable.  HEENT: EOM intact.  Neck: Neck supple. JVP not elevated. No masses  Chest: Clear to auscultation bilaterally  CV: s1 s2 RRR no murmurs, rubs or gallops   Abdomen: Soft, non-distended, non-tender  Extremity : + PP no c/c/e   Skin: No rashes or skin breakdown  Neurology: Alert and oriented times three. Sensation intact  Psych: Affect normal    ---  CONSULTANTS:     ---  TIME SPENT:  I, the attending physician, was physically present for the key portions of the evaluation and management (E/M) service provided. 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  Pain crampy and intermittent, no radiation of pain.  Pain started yesterday.  No meds taken for pain. Since November of 2023 patient has had pleuritic chest pain, night sweats, and cough and per patient 3 weeks ago she was dx w/ latent TB and subsequently informed it was active TB w/ positive sputum; admitted for 9 days at [facility] and started on anti-tb trx (ethambutol, rifampin, and moxifloxacin 2/2 rxn to [facility]), Follows with TB clinic at [facility]. Pt from [facility], [place]. Denies chest pain, palpitations, SOB, cough, diarrhea, constipation, urinary frequency, urgency, or dysuria, headaches, changes in vision, dizziness, numbness, tingling. Denies recent travel or sick contacts.     IN THE ED:  Temp  99.1 F , HR 91 , BP 92/60  ,RR 18 , SpO2 99% on RA  S/P 1L NS bolus x 2; Ofirmev, famotidine, ketorolac, Zofran x 2, Maalox x 1  Imaging:   CT A&P:   - Partial small bowel obstruction due to irregular bowel wall thickening and inflammation involving the terminal ileum, cecum and base of the appendix. Differential considerations include infection (including atypical/tuberculous infection given patient's history), inflammatory   bowel disease, or malignancy. Consider correlation with colonoscopy.  - Heterogeneous left subdiaphragmatic peritoneal nodularity measuring up to 8.2 cm in conglomerate, which is favored to be infectious but peritoneal metastases not excluded.  - 1.5 cm ill-defined hypodensity in the inferior pole left kidney, which may represent infection and possibly same process as the bowel and peritoneal processes.  - Mild right lower quadrant mesenteric lymphadenopathy, which may be reactive or metastatic.    RUQ U/S - Mild hepatomegaly. Question mild liver surface nodularity, raising the possibility of underlying cirrhosis. Unremarkable sonographic appearance of the gallbladder. No biliary duct dilation. Trace perihepatic ascites. (24 Apr 2023 19:52)      ---  HOSPITAL COURSE: Patient admitted to medicine floor for management of abdominal pain, N/V. In ED studies CT Abd showed partial Small Bowel Obstruction which has now resolved. You were seen by infectious disease, surgery and gastroenterology; you did not require any surgical/gastroenterology and/or antibiotics for these findings. You were recently diagnosed with outpatient TB infection 3 weeks on treatment, medications continued during admission. N /V have resolved, diet advanced. Patient now optimized for discharge with outpatient follow up with gastroenterology at [facility] clinic (patient currently a patient there).     Pt seen and examined on day of discharge. Patient is medically optimized for discharge to home with close outpatient followup.    PHYSICAL EXAM ON DAY OF DISCHARGE:    General: No distress. Comfortable.  HEENT: EOM intact.  Neck: Neck supple. JVP not elevated. No masses  Chest: Clear to auscultation bilaterally  CV: s1 s2 RRR no murmurs, rubs or gallops   Abdomen: Soft, non-distended, non-tender  Extremity : + PP no c/c/e   Skin: No rashes or skin breakdown  Neurology: Alert and oriented times three. Sensation intact  Psych: Affect normal    ---  CONSULTANTS:     ---  TIME SPENT:  I, the attending physician, was physically present for the key portions of the evaluation and management (E/M) service provided. 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  Pain crampy and intermittent, no radiation of pain.  Pain started yesterday.  No meds taken for pain. Since November of 2023 patient has had pleuritic chest pain, night sweats, and cough and per patient 3 weeks ago she was dx w/ latent TB and subsequently informed it was active TB w/ positive sputum; admitted for 9 days at [facility] and started on anti-tb trx (ethambutol, rifampin, and moxifloxacin 2/2 rxn to [facility]), Follows with TB clinic at [facility]. Pt from [facility], [place] ago. Denies chest pain, palpitations, SOB, cough, diarrhea, constipation, urinary frequency, urgency, or dysuria, headaches, changes in vision, dizziness, numbness, tingling. Denies recent travel or sick contacts.     IN THE ED:  Temp  99.1 F , HR 91 , BP 92/60  ,RR 18 , SpO2 99% on RA  S/P 1L NS bolus x 2; Ofirmev, famotidine, ketorolac, Zofran x 2, Maalox x 1  Imaging:   CT A&P:   - Partial small bowel obstruction due to irregular bowel wall thickening and inflammation involving the terminal ileum, cecum and base of the appendix. Differential considerations include infection (including atypical/tuberculous infection given patient's history), inflammatory   bowel disease, or malignancy. Consider correlation with colonoscopy.  - Heterogeneous left subdiaphragmatic peritoneal nodularity measuring up to 8.2 cm in conglomerate, which is favored to be infectious but peritoneal metastases not excluded.  - 1.5 cm ill-defined hypodensity in the inferior pole left kidney, which may represent infection and possibly same process as the bowel and peritoneal processes.  - Mild right lower quadrant mesenteric lymphadenopathy, which may be reactive or metastatic.    RUQ U/S - Mild hepatomegaly. Question mild liver surface nodularity, raising the possibility of underlying cirrhosis. Unremarkable sonographic appearance of the gallbladder. No biliary duct dilation. Trace perihepatic ascites. (24 Apr 2023 19:52)      ---  HOSPITAL COURSE: Patient admitted to medicine floor for management of abdominal pain, N/V. In ED studies CT Abd showed partial Small Bowel Obstruction which has now resolved. You were seen by infectious disease, surgery and gastroenterology; you did not require any surgical/gastroenterology and/or antibiotics for these findings. You were recently diagnosed with outpatient TB infection 3 weeks on treatment, medications continued during admission. N /V have resolved, diet advanced. Patient now optimized for discharge with outpatient follow up with gastroenterology at [facility] clinic (patient currently a patient there).     Pt seen and examined on day of discharge. Patient is medically optimized for discharge to home with close outpatient followup.    PHYSICAL EXAM ON DAY OF DISCHARGE:    General: No distress. Comfortable.  HEENT: EOM intact.  Neck: Neck supple. JVP not elevated. No masses  Chest: Clear to auscultation bilaterally  CV: s1 s2 RRR no murmurs, rubs or gallops   Abdomen: Soft, non-distended, non-tender  Extremity : + PP no c/c/e   Skin: No rashes or skin breakdown  Neurology: Alert and oriented times three. Sensation intact  Psych: Affect normal    ---  CONSULTANTS:     ---  TIME SPENT:  I, the attending physician, was physically present for the key portions of the evaluation and management (E/M) service provided. The total amount of time spent reviewing the hospital notes, laboratory values, imaging findings, assessing/counseling the patient, discussing with consultant physicians, social work, nursing staff was 39 minutes

## 2023-04-26 NOTE — PROGRESS NOTE ADULT - SUBJECTIVE AND OBJECTIVE BOX
SURGERY PA NOTE ON BEHALF OF DR. QUIJANO / GENERAL SURGERY:    S: Patient seen and examined at bedside.  Bout of nausea overnight noted - treated with anti-emetics.  Last vomiting episode was approx. 3am early this morning.  Currently, patient reports feeling much better and denies any abdominal pain, nausea, or vomiting.  Has been NPO since admission.  Admits to passing flatus, and to a BM just before this examination - non-bloody, non-diarrhea.  Has been ambulating, has been urinating without issue.  Denies CP/SOB/GUARDADO/palpitations/dizziness/lightheadedness.       MEDICATIONS:  acetaminophen     Tablet .. 650 milliGRAM(s) Oral every 6 hours PRN  albuterol    90 MICROgram(s) HFA Inhaler 2 Puff(s) Inhalation every 6 hours PRN  bisacodyl 5 milliGRAM(s) Oral daily PRN  ethambutol 1000 milliGRAM(s) Oral daily  melatonin 3 milliGRAM(s) Oral at bedtime PRN  morphine  - Injectable 1 milliGRAM(s) IV Push every 4 hours PRN  morphine  - Injectable 2 milliGRAM(s) IV Push every 4 hours PRN  naloxone Injectable 0.4 milliGRAM(s) IV Push once  ondansetron Injectable 4 milliGRAM(s) IV Push every 4 hours PRN  polyethylene glycol 3350 17 Gram(s) Oral daily  pyridoxine 50 milliGRAM(s) Oral daily  rifAMPin 300 milliGRAM(s) Oral daily  senna 2 Tablet(s) Oral at bedtime    O:  Vital Signs Last 24 Hrs  T(C): 37.1 (25 Apr 2023 08:26), Max: 37.3 (24 Apr 2023 16:30)  T(F): 98.7 (25 Apr 2023 08:26), Max: 99.1 (24 Apr 2023 16:30)  HR: 79 (25 Apr 2023 08:26) (79 - 91)  BP: 103/64 (25 Apr 2023 08:26) (92/60 - 103/68)  RR: 18 (25 Apr 2023 08:26) (16 - 18)  SpO2: 96% (25 Apr 2023 08:26) (94% - 99%)    Parameters below as of 25 Apr 2023 08:26  Patient On (Oxygen Delivery Method): room air    PHYSICAL EXAM:  Lungs: CTA bilat without W/R/R  Card: S1S2  Abd: Softly distended, with mild tenderness to deep palpation in the epigastrium and suprapubic region.  +BS x 4.  No rebound/guarding.    Ext: Calves soft, NT, without edema bilat    LABS:                        12.4   8.78  )-----------( 165      ( 25 Apr 2023 06:20 )             38.4     04-25    140  |  111<H>  |  10  ----------------------------<  105<H>  3.9   |  22  |  0.71    Ca    8.3<L>      25 Apr 2023 06:20    TPro  7.3  /  Alb  3.5  /  TBili  0.5  /  DBili  x   /  AST  28  /  ALT  44  /  AlkPhos  94  04-25    A:  37yo F presented with N/V and abdominal pain in the setting of mycobacterium bovis infection   SBO likely related to M. bovis infectious process    P:  Patient currently feeling well/improving since admission  No leukocytosis  H&H and vitals stable and reassuring, remains afebrile   Lytes and renal indices stable   Demonstrating some return of bowel function today  Abdomen softly distended on exam today - would cautiously consider possible diet advance   No invasive surgical intervention indicated or warranted at this time  Will d/w Dr. Quijano, will follow     
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 presented with nausea vomiting abdominal pain admitted to the hospital with partial small bowel obstruction with possible disseminated (ileal, cecal, peritoneal,  lymph deon)  mycobacterial bovis infection     patient seen and examined   normal WBC count   normal LFTs, trend while inpatient   significant  relief in symptoms with symptom management with IV fluid, antiemetics and pain management which  I recommend to continue further as needed     recommend resumption of  antimycobacterial  agents as per patient's outpatient regimen including ethambutol 1 g daily, rifampin 600 mg daily, moxifloxacin 400 mg daily (plan discussed with clinical pharmacy as well as bed side nursing and patient to take her own moxifloxacin)   inherently resistant to pyrazinamide  ?  pericarditis with INH as per patient requiring short course colchicine    should try to obtain records from [facility] ( left a message to their office) and on discharge recommend giving CT abd reports for [facility] clinic    advance diet as tolerated   surgical/GI teams involved in the care     CT Abdomen and Pelvis w/ IV Cont (04.24.23 ): Partial small bowel obstruction due to irregular bowel wall thickening and inflammation involving the terminal ileum, cecum and base of the appendix. Differential considerations include infection (including   atypical/tuberculous infection given patient's history), inflammatory bowel disease, or malignancy. Consider correlation with colonoscopy. Heterogeneous left subdiaphragmatic peritoneal nodularity measuring up to  8.2 cm in conglomerate, which is favored to be infectious but peritoneal metastases not excluded. 1.5 cm ill-defined hypodensity in the inferior pole left kidney, which may represent infection and possibly same process as the bowel and peritoneal processes. Mild right lower quadrant mesenteric lymphadenopathy, which may be reactive or metastatic.      Plan explained to patient   D/w Primary team     ________________________________    Last 24 hours:   No overnight events   Denied any further N/V  Tolerating clear liq diet     Further ROS:  All systems reviewed. No other complaints besides mentioned above     ______________________________  Allergies  amoxicillin (Rash)    Medications:   ethambutol 1000 milliGRAM(s) Oral daily  moxifloxacin 400 milliGRAM(s) Oral daily  rifAMPin 600 milliGRAM(s) Oral daily  acetaminophen     Tablet .. 650 milliGRAM(s) Oral every 6 hours PRN  albuterol    90 MICROgram(s) HFA Inhaler 2 Puff(s) Inhalation every 6 hours PRN  bisacodyl 5 milliGRAM(s) Oral daily PRN  melatonin 3 milliGRAM(s) Oral at bedtime PRN  morphine  - Injectable 2 milliGRAM(s) IV Push every 4 hours PRN  morphine  - Injectable 1 milliGRAM(s) IV Push every 4 hours PRN  naloxone Injectable 0.4 milliGRAM(s) IV Push once  ondansetron Injectable 4 milliGRAM(s) IV Push every 4 hours PRN  polyethylene glycol 3350 17 Gram(s) Oral daily  pyridoxine 50 milliGRAM(s) Oral daily  senna 2 Tablet(s) Oral at bedtime      _________________    PHYSICAL EXAM:    Objective:  Vital Signs Last 24 Hrs  T(C): 37 (26 Apr 2023 05:00), Max: 37.3 (25 Apr 2023 13:19)  T(F): 98.6 (26 Apr 2023 05:00), Max: 99.1 (25 Apr 2023 13:19)  HR: 82 (26 Apr 2023 05:00) (82 - 96)  BP: 105/68 (26 Apr 2023 05:00) (97/62 - 108/64)  RR: 18 (26 Apr 2023 05:00) (18 - 18)  SpO2: 97% (26 Apr 2023 05:00) (96% - 99%)    Parameters below as of 26 Apr 2023 05:00  Patient On (Oxygen Delivery Method): room air        General: No acute distress.  Lying in bed comfortably   Neuro: AAO*3, No motor, sensory, or cranial nerve deficit  HEENT: Pupils equal, reactive to light, Oral mucosa moist  PULM: Clear to auscultation bilaterally, no significant adventitious breath sounds   CVS: Regular rhythm and controlled rate, no murmurs, rubs, or gallops  ABD: Soft, nondistended, minimally tender over epigastric region, normoactive bowel sounds, no CVA tenderness  EXT: No b/l LE edema, nontender with pedal pulse palpable   SKIN: Warm and well perfused, no acute rashes   ______________  LABS, MICROBIOLOGY, RADIOLOGY & ADDITIONAL STUDIES: Reviewed 
Patient is a 38y old  Female who presents with a chief complaint of     INTERVAL HPI/OVERNIGHT EVENTS: Pt seen and examined bedside, has no complaints. Currently, patient reports feeling much better and denies any abdominal pain, nausea, or vomiting.  Has been NPO since admission.  Admits to passing flatus, and to a BM just before this examination - non-bloody, non-diarrhea.  Has been ambulating, has been urinating without issue.  Denies CP/SOB/GUARDADO/palpitations/dizziness/lightheadedness.       MEDICATIONS  (STANDING):  ethambutol 1000 milliGRAM(s) Oral daily  naloxone Injectable 0.4 milliGRAM(s) IV Push once  polyethylene glycol 3350 17 Gram(s) Oral daily  pyridoxine 50 milliGRAM(s) Oral daily  rifAMPin 600 milliGRAM(s) Oral daily  senna 2 Tablet(s) Oral at bedtime    MEDICATIONS  (PRN):  acetaminophen     Tablet .. 650 milliGRAM(s) Oral every 6 hours PRN Temp greater or equal to 38C (100.4F), Mild Pain (1 - 3)  albuterol    90 MICROgram(s) HFA Inhaler 2 Puff(s) Inhalation every 6 hours PRN for shortness of breath and/or wheezing  bisacodyl 5 milliGRAM(s) Oral daily PRN Constipation  melatonin 3 milliGRAM(s) Oral at bedtime PRN Insomnia  morphine  - Injectable 1 milliGRAM(s) IV Push every 4 hours PRN Moderate Pain (4 - 6)  morphine  - Injectable 2 milliGRAM(s) IV Push every 4 hours PRN Severe Pain (7 - 10)  ondansetron Injectable 4 milliGRAM(s) IV Push every 4 hours PRN Nausea and/or Vomiting      Allergies    amoxicillin (Rash)    Intolerances        REVIEW OF SYSTEMS:  CONSTITUTIONAL: No fever or chills  HEENT:  No headache, no sore throat  RESPIRATORY: No cough, wheezing, or shortness of breath  CARDIOVASCULAR: No chest pain, palpitations, or leg swelling  GASTROINTESTINAL: No abd pain, nausea, vomiting, or diarrhea  GENITOURINARY: No dysuria, frequency, or hematuria  NEUROLOGICAL: no focal weakness or dizziness  MUSCULOSKELETAL: no myalgias     Vital Signs Last 24 Hrs  T(C): 37.1 (2023 08:26), Max: 37.3 (2023 16:30)  T(F): 98.7 (2023 08:26), Max: 99.1 (2023 16:30)  HR: 79 (2023 08:26) (79 - 91)  BP: 103/64 (2023 08:26) (92/60 - 103/68)  BP(mean): --  RR: 18 (2023 08:26) (16 - 18)  SpO2: 96% (2023 08:26) (94% - 99%)    Parameters below as of 2023 08:26  Patient On (Oxygen Delivery Method): room air        PHYSICAL EXAM:  GENERAL: NAD  HEENT:  EOMI, moist mucous membranes  CHEST/LUNG:  CTA b/l, no rales, wheezes, or rhonchi  HEART:  RRR, S1, S2  ABDOMEN:  BS+, soft, nontender, nondistended  EXTREMITIES: no edema, cyanosis, or calf tenderness  NERVOUS SYSTEM: AA&Ox3    LABS:                        12.4   8.78  )-----------( 165      ( 2023 06:20 )             38.4     CBC Full  -  ( 2023 06:20 )  WBC Count : 8.78 K/uL  Hemoglobin : 12.4 g/dL  Hematocrit : 38.4 %  Platelet Count - Automated : 165 K/uL  Mean Cell Volume : 80.3 fl  Mean Cell Hemoglobin : 25.9 pg  Mean Cell Hemoglobin Concentration : 32.3 gm/dL  Auto Neutrophil # : 6.90 K/uL  Auto Lymphocyte # : 0.67 K/uL  Auto Monocyte # : 1.05 K/uL  Auto Eosinophil # : 0.09 K/uL  Auto Basophil # : 0.04 K/uL  Auto Neutrophil % : 78.6 %  Auto Lymphocyte % : 7.6 %  Auto Monocyte % : 12.0 %  Auto Eosinophil % : 1.0 %  Auto Basophil % : 0.5 %    2023 06:20    140    |  111    |  10     ----------------------------<  105    3.9     |  22     |  0.71     Ca    8.3        2023 06:20    TPro  7.3    /  Alb  3.5    /  TBili  0.5    /  DBili  x      /  AST  28     /  ALT  44     /  AlkPhos  94     2023 06:20      Urinalysis Basic - ( 2023 16:11 )    Color: Pale Yellow / Appearance: Clear / S.005 / pH: x  Gluc: x / Ketone: Trace  / Bili: Negative / Urobili: Negative   Blood: x / Protein: Negative / Nitrite: Negative   Leuk Esterase: Negative / RBC: x / WBC x   Sq Epi: x / Non Sq Epi: x / Bacteria: x      CAPILLARY BLOOD GLUCOSE              RADIOLOGY & ADDITIONAL TESTS:    Personally reviewed.     Consultant(s) Notes Reviewed:  [x] YES  [ ] NO    Care Discussed with [x] Consultants  [x] Patient  [ ] Family  [ ]      [ ] Other; RN  DVT ppx  
SURGERY PA NOTE ON BEHALF OF DR. QUIJANO / GENERAL SURGERY:    S: Patient seen and examined at bedside.  Reports further improvement from yesterday.  Denies any abdominal pain, bloating, nausea, or vomiting.  Tolerating clears without any post-prandial complaints.  Requesting more substantial food, reports she is hungry.  Admits to continued flatus, bm, and normal urination.  Has been ambulating.  Denies CP/SOB/GUARDAOD/palpitations/dizziness/lightheadedness.      MEDICATIONS:  acetaminophen     Tablet .. 650 milliGRAM(s) Oral every 6 hours PRN  albuterol    90 MICROgram(s) HFA Inhaler 2 Puff(s) Inhalation every 6 hours PRN  bisacodyl 5 milliGRAM(s) Oral daily PRN  ethambutol 1000 milliGRAM(s) Oral daily  melatonin 3 milliGRAM(s) Oral at bedtime PRN  morphine  - Injectable 1 milliGRAM(s) IV Push every 4 hours PRN  morphine  - Injectable 2 milliGRAM(s) IV Push every 4 hours PRN  moxifloxacin 400 milliGRAM(s) Oral daily  naloxone Injectable 0.4 milliGRAM(s) IV Push once  ondansetron Injectable 4 milliGRAM(s) IV Push every 4 hours PRN  polyethylene glycol 3350 17 Gram(s) Oral daily  pyridoxine 50 milliGRAM(s) Oral daily  rifAMPin 600 milliGRAM(s) Oral daily  senna 2 Tablet(s) Oral at bedtime    O:  Vital Signs Last 24 Hrs  T(C): 37 (26 Apr 2023 05:00), Max: 37.3 (25 Apr 2023 13:19)  T(F): 98.6 (26 Apr 2023 05:00), Max: 99.1 (25 Apr 2023 13:19)  HR: 82 (26 Apr 2023 05:00) (82 - 96)  BP: 105/68 (26 Apr 2023 05:00) (97/62 - 108/64)  RR: 18 (26 Apr 2023 05:00) (18 - 18)  SpO2: 97% (26 Apr 2023 05:00) (96% - 99%)  Parameters below as of 26 Apr 2023 05:00  Patient On (Oxygen Delivery Method): room air    PHYSICAL EXAM:  Lungs: CTA bilat without W/R/R  Card: S1S2  Abd: Softly distended, but further improvement from yesterday's exam noted.  Now non-tender throughout.  +BS x 4.  No rebound/guarding.    Ext: Calves soft, NT, without edema bilat    LABS:                        12.3   5.17  )-----------( 156      ( 26 Apr 2023 05:20 )             37.7     04-26    141  |  111<H>  |  9   ----------------------------<  89  3.8   |  24  |  0.79    Ca    8.3<L>      26 Apr 2023 05:20    TPro  7.3  /  Alb  3.5  /  TBili  0.5  /  DBili  x   /  AST  28  /  ALT  44  /  AlkPhos  94  04-25    A:  39yo F presented with N/V and abdominal pain in the setting of mycobacterium bovis infection   SBO likely related to M. bovis infectious process, now resolving     P:  Patient with improved exam  No leukocytosis - remains stable  H&H stable   Vitals stable and reassuring, remains afebrile   Lytes/renal indices stable  Advance to low-residue diet, will f/u tolerance   Continue current care per primary team  No invasive surgical treatment indicated or warranted at this time  Discussed with Dr. Quijano, will follow       
[facility]  Cornelio Miller PA-C  [address] [place] [address]  [phone]      INTERVAL HPI/OVERNIGHT EVENTS:  Pt s/e  Reports no new GI events    MEDICATIONS  (STANDING):  ethambutol 1000 milliGRAM(s) Oral daily  moxifloxacin 400 milliGRAM(s) Oral daily  naloxone Injectable 0.4 milliGRAM(s) IV Push once  polyethylene glycol 3350 17 Gram(s) Oral daily  pyridoxine 50 milliGRAM(s) Oral daily  rifAMPin 600 milliGRAM(s) Oral daily  senna 2 Tablet(s) Oral at bedtime    MEDICATIONS  (PRN):  acetaminophen     Tablet .. 650 milliGRAM(s) Oral every 6 hours PRN Temp greater or equal to 38C (100.4F), Mild Pain (1 - 3)  albuterol    90 MICROgram(s) HFA Inhaler 2 Puff(s) Inhalation every 6 hours PRN for shortness of breath and/or wheezing  bisacodyl 5 milliGRAM(s) Oral daily PRN Constipation  melatonin 3 milliGRAM(s) Oral at bedtime PRN Insomnia  morphine  - Injectable 1 milliGRAM(s) IV Push every 4 hours PRN Moderate Pain (4 - 6)  morphine  - Injectable 2 milliGRAM(s) IV Push every 4 hours PRN Severe Pain (7 - 10)  ondansetron Injectable 4 milliGRAM(s) IV Push every 4 hours PRN Nausea and/or Vomiting      Allergies    amoxicillin (Rash)      PHYSICAL EXAM:   Vital Signs:  Vital Signs Last 24 Hrs  T(C): 37 (2023 05:00), Max: 37.3 (2023 13:19)  T(F): 98.6 (2023 05:00), Max: 99.1 (2023 13:19)  HR: 82 (2023 05:00) (82 - 96)  BP: 105/68 (2023 05:00) (97/62 - 108/64)  BP(mean): --  RR: 18 (2023 05:00) (18 - 18)  SpO2: 97% (2023 05:00) (96% - 99%)    Parameters below as of 2023 05:00  Patient On (Oxygen Delivery Method): room air      Daily     Daily Weight in k.2 (2023 05:00)    GENERAL:  Appears stated age  HEENT:  NC/AT  CHEST:  Full & symmetric excursion  HEART:  Regular rhythm  ABDOMEN:  Soft, non-tender, non-distended  EXTEREMITIES:  no cyanosis  SKIN:  No rash  NEURO:  Alert      LABS:                        12.3   5.17  )-----------( 156      ( 2023 05:20 )             37.7     04-26    141  |  111<H>  |  9   ----------------------------<  89  3.8   |  24  |  0.79    Ca    8.3<L>      2023 05:20    TPro  7.3  /  Alb  3.5  /  TBili  0.5  /  DBili  x   /  AST  28  /  ALT  44  /  AlkPhos  94  04-25      Urinalysis Basic - ( 2023 16:11 )    Color: Pale Yellow / Appearance: Clear / S.005 / pH: x  Gluc: x / Ketone: Trace  / Bili: Negative / Urobili: Negative   Blood: x / Protein: Negative / Nitrite: Negative   Leuk Esterase: Negative / RBC: x / WBC x   Sq Epi: x / Non Sq Epi: x / Bacteria: x

## 2023-04-26 NOTE — PROGRESS NOTE ADULT - ASSESSMENT
abnormal CT   abdominal pain    cont clears today  cont antibiotics per ID  doubt colonoscopy will   outpatient follow up prn with [facility] clinic after d/c    I reviewed the overnight course of events on the unit, re-confirming the patient history. I discussed the care with the patient and their family  Differential diagnosis and plan of care discussed with patient after the evaluation  50 minutes spent on total encounter of which more than fifty percent of the encounter was spent counseling and/or coordinating care by the attending physician.  Advanced care planning was discussed with patient and family.  Advanced care planning forms were reviewed and discussed.  Risks, benefits and alternatives of gastroenterologic procedures were discussed in detail and all questions were answered.  
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 Admit for SBO likely 2/2 abdominal mycobacteria bovis.

## 2023-05-01 PROBLEM — Z00.00 ENCOUNTER FOR PREVENTIVE HEALTH EXAMINATION: Status: ACTIVE | Noted: 2023-05-01

## 2023-05-11 ENCOUNTER — APPOINTMENT (OUTPATIENT)
Dept: PEDIATRIC INFECTIOUS DISEASE | Facility: CLINIC | Age: 38
End: 2023-05-11

## 2023-11-14 ENCOUNTER — INPATIENT (INPATIENT)
Facility: HOSPITAL | Age: 38
LOS: 2 days | Discharge: HOME | DRG: 54 | End: 2023-11-17
Attending: INTERNAL MEDICINE | Admitting: INTERNAL MEDICINE
Payer: MEDICAID

## 2023-11-14 VITALS
RESPIRATION RATE: 18 BRPM | HEIGHT: 66.14 IN | DIASTOLIC BLOOD PRESSURE: 74 MMHG | OXYGEN SATURATION: 99 % | HEART RATE: 98 BPM | WEIGHT: 119.93 LBS | SYSTOLIC BLOOD PRESSURE: 108 MMHG | TEMPERATURE: 98 F

## 2023-11-14 DIAGNOSIS — G93.6 CEREBRAL EDEMA: ICD-10-CM

## 2023-11-14 DIAGNOSIS — R06.00 DYSPNEA, UNSPECIFIED: ICD-10-CM

## 2023-11-14 DIAGNOSIS — A15.9 RESPIRATORY TUBERCULOSIS UNSPECIFIED: ICD-10-CM

## 2023-11-14 DIAGNOSIS — R20.0 ANESTHESIA OF SKIN: ICD-10-CM

## 2023-11-14 LAB
ALBUMIN SERPL ELPH-MCNC: 4.1 G/DL — SIGNIFICANT CHANGE UP (ref 3.3–5)
ALBUMIN SERPL ELPH-MCNC: 4.1 G/DL — SIGNIFICANT CHANGE UP (ref 3.3–5)
ALP SERPL-CCNC: 109 U/L — SIGNIFICANT CHANGE UP (ref 40–120)
ALP SERPL-CCNC: 109 U/L — SIGNIFICANT CHANGE UP (ref 40–120)
ALT FLD-CCNC: 19 U/L — SIGNIFICANT CHANGE UP (ref 12–78)
ALT FLD-CCNC: 19 U/L — SIGNIFICANT CHANGE UP (ref 12–78)
ANION GAP SERPL CALC-SCNC: 5 MMOL/L — SIGNIFICANT CHANGE UP (ref 5–17)
ANION GAP SERPL CALC-SCNC: 5 MMOL/L — SIGNIFICANT CHANGE UP (ref 5–17)
AST SERPL-CCNC: 19 U/L — SIGNIFICANT CHANGE UP (ref 15–37)
AST SERPL-CCNC: 19 U/L — SIGNIFICANT CHANGE UP (ref 15–37)
BASOPHILS # BLD AUTO: 0.06 K/UL — SIGNIFICANT CHANGE UP (ref 0–0.2)
BASOPHILS # BLD AUTO: 0.06 K/UL — SIGNIFICANT CHANGE UP (ref 0–0.2)
BASOPHILS NFR BLD AUTO: 0.8 % — SIGNIFICANT CHANGE UP (ref 0–2)
BASOPHILS NFR BLD AUTO: 0.8 % — SIGNIFICANT CHANGE UP (ref 0–2)
BILIRUB SERPL-MCNC: 0.7 MG/DL — SIGNIFICANT CHANGE UP (ref 0.2–1.2)
BILIRUB SERPL-MCNC: 0.7 MG/DL — SIGNIFICANT CHANGE UP (ref 0.2–1.2)
BUN SERPL-MCNC: 12 MG/DL — SIGNIFICANT CHANGE UP (ref 7–23)
BUN SERPL-MCNC: 12 MG/DL — SIGNIFICANT CHANGE UP (ref 7–23)
CALCIUM SERPL-MCNC: 9 MG/DL — SIGNIFICANT CHANGE UP (ref 8.5–10.1)
CALCIUM SERPL-MCNC: 9 MG/DL — SIGNIFICANT CHANGE UP (ref 8.5–10.1)
CHLORIDE SERPL-SCNC: 111 MMOL/L — HIGH (ref 96–108)
CHLORIDE SERPL-SCNC: 111 MMOL/L — HIGH (ref 96–108)
CK SERPL-CCNC: 31 U/L — SIGNIFICANT CHANGE UP (ref 26–192)
CK SERPL-CCNC: 31 U/L — SIGNIFICANT CHANGE UP (ref 26–192)
CO2 SERPL-SCNC: 28 MMOL/L — SIGNIFICANT CHANGE UP (ref 22–31)
CO2 SERPL-SCNC: 28 MMOL/L — SIGNIFICANT CHANGE UP (ref 22–31)
CREAT SERPL-MCNC: 0.94 MG/DL — SIGNIFICANT CHANGE UP (ref 0.5–1.3)
CREAT SERPL-MCNC: 0.94 MG/DL — SIGNIFICANT CHANGE UP (ref 0.5–1.3)
EGFR: 80 ML/MIN/1.73M2 — SIGNIFICANT CHANGE UP
EGFR: 80 ML/MIN/1.73M2 — SIGNIFICANT CHANGE UP
EOSINOPHIL # BLD AUTO: 0.09 K/UL — SIGNIFICANT CHANGE UP (ref 0–0.5)
EOSINOPHIL # BLD AUTO: 0.09 K/UL — SIGNIFICANT CHANGE UP (ref 0–0.5)
EOSINOPHIL NFR BLD AUTO: 1.3 % — SIGNIFICANT CHANGE UP (ref 0–6)
EOSINOPHIL NFR BLD AUTO: 1.3 % — SIGNIFICANT CHANGE UP (ref 0–6)
GLUCOSE SERPL-MCNC: 83 MG/DL — SIGNIFICANT CHANGE UP (ref 70–99)
GLUCOSE SERPL-MCNC: 83 MG/DL — SIGNIFICANT CHANGE UP (ref 70–99)
HCT VFR BLD CALC: 42.4 % — SIGNIFICANT CHANGE UP (ref 34.5–45)
HCT VFR BLD CALC: 42.4 % — SIGNIFICANT CHANGE UP (ref 34.5–45)
HGB BLD-MCNC: 14.4 G/DL — SIGNIFICANT CHANGE UP (ref 11.5–15.5)
HGB BLD-MCNC: 14.4 G/DL — SIGNIFICANT CHANGE UP (ref 11.5–15.5)
IMM GRANULOCYTES NFR BLD AUTO: 0.4 % — SIGNIFICANT CHANGE UP (ref 0–0.9)
IMM GRANULOCYTES NFR BLD AUTO: 0.4 % — SIGNIFICANT CHANGE UP (ref 0–0.9)
LACTATE SERPL-SCNC: 1.4 MMOL/L — SIGNIFICANT CHANGE UP (ref 0.7–2)
LACTATE SERPL-SCNC: 1.4 MMOL/L — SIGNIFICANT CHANGE UP (ref 0.7–2)
LYMPHOCYTES # BLD AUTO: 0.79 K/UL — LOW (ref 1–3.3)
LYMPHOCYTES # BLD AUTO: 0.79 K/UL — LOW (ref 1–3.3)
LYMPHOCYTES # BLD AUTO: 11 % — LOW (ref 13–44)
LYMPHOCYTES # BLD AUTO: 11 % — LOW (ref 13–44)
MAGNESIUM SERPL-MCNC: 2.6 MG/DL — SIGNIFICANT CHANGE UP (ref 1.6–2.6)
MAGNESIUM SERPL-MCNC: 2.6 MG/DL — SIGNIFICANT CHANGE UP (ref 1.6–2.6)
MCHC RBC-ENTMCNC: 28.6 PG — SIGNIFICANT CHANGE UP (ref 27–34)
MCHC RBC-ENTMCNC: 28.6 PG — SIGNIFICANT CHANGE UP (ref 27–34)
MCHC RBC-ENTMCNC: 34 GM/DL — SIGNIFICANT CHANGE UP (ref 32–36)
MCHC RBC-ENTMCNC: 34 GM/DL — SIGNIFICANT CHANGE UP (ref 32–36)
MCV RBC AUTO: 84.1 FL — SIGNIFICANT CHANGE UP (ref 80–100)
MCV RBC AUTO: 84.1 FL — SIGNIFICANT CHANGE UP (ref 80–100)
MONOCYTES # BLD AUTO: 1.1 K/UL — HIGH (ref 0–0.9)
MONOCYTES # BLD AUTO: 1.1 K/UL — HIGH (ref 0–0.9)
MONOCYTES NFR BLD AUTO: 15.3 % — HIGH (ref 2–14)
MONOCYTES NFR BLD AUTO: 15.3 % — HIGH (ref 2–14)
NEUTROPHILS # BLD AUTO: 5.13 K/UL — SIGNIFICANT CHANGE UP (ref 1.8–7.4)
NEUTROPHILS # BLD AUTO: 5.13 K/UL — SIGNIFICANT CHANGE UP (ref 1.8–7.4)
NEUTROPHILS NFR BLD AUTO: 71.2 % — SIGNIFICANT CHANGE UP (ref 43–77)
NEUTROPHILS NFR BLD AUTO: 71.2 % — SIGNIFICANT CHANGE UP (ref 43–77)
NRBC # BLD: 0 /100 WBCS — SIGNIFICANT CHANGE UP (ref 0–0)
NRBC # BLD: 0 /100 WBCS — SIGNIFICANT CHANGE UP (ref 0–0)
PLATELET # BLD AUTO: 202 K/UL — SIGNIFICANT CHANGE UP (ref 150–400)
PLATELET # BLD AUTO: 202 K/UL — SIGNIFICANT CHANGE UP (ref 150–400)
POTASSIUM SERPL-MCNC: 3.5 MMOL/L — SIGNIFICANT CHANGE UP (ref 3.5–5.3)
POTASSIUM SERPL-MCNC: 3.5 MMOL/L — SIGNIFICANT CHANGE UP (ref 3.5–5.3)
POTASSIUM SERPL-SCNC: 3.5 MMOL/L — SIGNIFICANT CHANGE UP (ref 3.5–5.3)
POTASSIUM SERPL-SCNC: 3.5 MMOL/L — SIGNIFICANT CHANGE UP (ref 3.5–5.3)
PROT SERPL-MCNC: 8.6 G/DL — HIGH (ref 6–8.3)
PROT SERPL-MCNC: 8.6 G/DL — HIGH (ref 6–8.3)
RBC # BLD: 5.04 M/UL — SIGNIFICANT CHANGE UP (ref 3.8–5.2)
RBC # BLD: 5.04 M/UL — SIGNIFICANT CHANGE UP (ref 3.8–5.2)
RBC # FLD: 12.9 % — SIGNIFICANT CHANGE UP (ref 10.3–14.5)
RBC # FLD: 12.9 % — SIGNIFICANT CHANGE UP (ref 10.3–14.5)
SODIUM SERPL-SCNC: 144 MMOL/L — SIGNIFICANT CHANGE UP (ref 135–145)
SODIUM SERPL-SCNC: 144 MMOL/L — SIGNIFICANT CHANGE UP (ref 135–145)
WBC # BLD: 7.2 K/UL — SIGNIFICANT CHANGE UP (ref 3.8–10.5)
WBC # BLD: 7.2 K/UL — SIGNIFICANT CHANGE UP (ref 3.8–10.5)
WBC # FLD AUTO: 7.2 K/UL — SIGNIFICANT CHANGE UP (ref 3.8–10.5)
WBC # FLD AUTO: 7.2 K/UL — SIGNIFICANT CHANGE UP (ref 3.8–10.5)

## 2023-11-14 PROCEDURE — 70496 CT ANGIOGRAPHY HEAD: CPT | Mod: 26,MA

## 2023-11-14 PROCEDURE — 70450 CT HEAD/BRAIN W/O DYE: CPT | Mod: 26,MA,59

## 2023-11-14 PROCEDURE — 93971 EXTREMITY STUDY: CPT | Mod: 26,RT

## 2023-11-14 PROCEDURE — 0042T: CPT

## 2023-11-14 PROCEDURE — 70498 CT ANGIOGRAPHY NECK: CPT | Mod: 26,MA

## 2023-11-14 PROCEDURE — 99285 EMERGENCY DEPT VISIT HI MDM: CPT

## 2023-11-14 RX ORDER — MOXIFLOXACIN HYDROCHLORIDE TABLETS, 400 MG 400 MG/1
1 TABLET, FILM COATED ORAL
Refills: 0 | DISCHARGE

## 2023-11-14 RX ORDER — SODIUM CHLORIDE 9 MG/ML
1000 INJECTION INTRAMUSCULAR; INTRAVENOUS; SUBCUTANEOUS ONCE
Refills: 0 | Status: COMPLETED | OUTPATIENT
Start: 2023-11-14 | End: 2023-11-14

## 2023-11-14 RX ORDER — SODIUM CHLORIDE 9 MG/ML
1000 INJECTION INTRAMUSCULAR; INTRAVENOUS; SUBCUTANEOUS
Refills: 0 | Status: DISCONTINUED | OUTPATIENT
Start: 2023-11-14 | End: 2023-11-17

## 2023-11-14 RX ORDER — ONDANSETRON 8 MG/1
4 TABLET, FILM COATED ORAL EVERY 8 HOURS
Refills: 0 | Status: DISCONTINUED | OUTPATIENT
Start: 2023-11-14 | End: 2023-11-17

## 2023-11-14 RX ORDER — ETHAMBUTOL HYDROCHLORIDE 400 MG/1
1000 TABLET, FILM COATED ORAL DAILY
Refills: 0 | Status: DISCONTINUED | OUTPATIENT
Start: 2023-11-14 | End: 2023-11-14

## 2023-11-14 RX ORDER — LANOLIN ALCOHOL/MO/W.PET/CERES
3 CREAM (GRAM) TOPICAL AT BEDTIME
Refills: 0 | Status: DISCONTINUED | OUTPATIENT
Start: 2023-11-14 | End: 2023-11-17

## 2023-11-14 RX ORDER — ENOXAPARIN SODIUM 100 MG/ML
40 INJECTION SUBCUTANEOUS EVERY 24 HOURS
Refills: 0 | Status: DISCONTINUED | OUTPATIENT
Start: 2023-11-14 | End: 2023-11-17

## 2023-11-14 RX ORDER — PYRIDOXINE HCL (VITAMIN B6) 100 MG
1 TABLET ORAL
Refills: 0 | DISCHARGE

## 2023-11-14 RX ORDER — ALBUTEROL 90 UG/1
2 AEROSOL, METERED ORAL
Qty: 0 | Refills: 0 | DISCHARGE

## 2023-11-14 RX ORDER — ACETAMINOPHEN 500 MG
650 TABLET ORAL EVERY 6 HOURS
Refills: 0 | Status: DISCONTINUED | OUTPATIENT
Start: 2023-11-14 | End: 2023-11-17

## 2023-11-14 RX ORDER — ETHAMBUTOL HYDROCHLORIDE 400 MG/1
1000 TABLET, FILM COATED ORAL DAILY
Refills: 0 | Status: DISCONTINUED | OUTPATIENT
Start: 2023-11-15 | End: 2023-11-17

## 2023-11-14 RX ORDER — ACETAMINOPHEN 500 MG
1000 TABLET ORAL ONCE
Refills: 0 | Status: COMPLETED | OUTPATIENT
Start: 2023-11-14 | End: 2023-11-14

## 2023-11-14 RX ADMIN — Medication 1000 MILLIGRAM(S): at 15:17

## 2023-11-14 RX ADMIN — Medication 400 MILLIGRAM(S): at 14:24

## 2023-11-14 RX ADMIN — SODIUM CHLORIDE 60 MILLILITER(S): 9 INJECTION INTRAMUSCULAR; INTRAVENOUS; SUBCUTANEOUS at 22:18

## 2023-11-14 RX ADMIN — SODIUM CHLORIDE 1000 MILLILITER(S): 9 INJECTION INTRAMUSCULAR; INTRAVENOUS; SUBCUTANEOUS at 14:25

## 2023-11-14 NOTE — ED ADULT NURSE NOTE - CHPI ED NUR SYMPTOMS POS
Dr. Ellington [facility] Additional Procedure 2/15/18   Received: Today   Message Contents   Candice MCCORMACK  Preaut Pool; Viviana Pena             Pt is sched for EGD on 2/15, provider wants to add Colonoscopy   Proced code-  08606  52868   Diag Code- D50.9         Thanks, Caitlin Dahl  Female, 75 year old, 1942   NAUSEA

## 2023-11-14 NOTE — ED ADULT NURSE NOTE - OBJECTIVE STATEMENT
Patient received complaining of fall with right lower leg numbness. States she has been having some numbness/tingling "feels like pins and needles" intermittently for the past few days. States when an episode happened she fell, denies hitting head, denies LoC, denies use of blood thinners but has had nausea and SOB and used her inhaler.  has been on TB medication regiment since march. At this time in no acute distress, Patient is AOx4, safety precautions in place, awaiting evaluation.

## 2023-11-14 NOTE — H&P ADULT - PROBLEM SELECTOR PLAN 2
Dyspnea with associated pleuritic chest pain -- acute on chronic  Check CTA chest  Albuterol prn  Pulmonary consult  Further work-up/management pending clinical course.

## 2023-11-14 NOTE — H&P ADULT - PROBLEM SELECTOR PLAN 1
Admit to remote tele  Abnormality seen on CT head -- R/O CVA, R/O mass  Check MRI Brain with iv gadolinium, ECHO   Neuro check q4h  Seizure precautions  PT  Neuro eval  Further work-up/management pending clinical course.

## 2023-11-14 NOTE — PATIENT PROFILE ADULT - FALL HARM RISK - HARM RISK INTERVENTIONS

## 2023-11-14 NOTE — H&P ADULT - HISTORY OF PRESENT ILLNESS
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, rifampin and moxifloxacin (since end of March) presents to ED after episode of tingling to her right leg x earlier today. Pt reports intermittent numbness/tingling to her right leg x months since starting her regimen. Today, pt reports episode this AM when her right leg went numb, states it felt like it had fallen asleep, tried to "shake it out" but symptoms persisted for several minutes and then pt states her right foot started twitching. Pt states she became very nervous, hyperventilated, and felt twitching sensation throughout her entire body for several minutes. Pt now c/o headache. Pt denies dizziness, chest pain, SOB, worsening cough, abd pain, N/V/D, fever/chills, back pain. Patient reports that she's had pleuritic CP at the base of her lungs since TB diagnosis but it has gotten slightly worse over the past week.

## 2023-11-14 NOTE — ED PROVIDER NOTE - OBJECTIVE STATEMENT
37 yo F PMHx asthma, mycobacterium bovis pulmonary infection on ethambutol, rifampin and moxifloxacin presents to ED after episode of tingling to her right leg x earlier today. Pt reports intermittent numbness/tingling to her right leg x months since starting her regimen. Today, pt reports episode this AM when her right leg went numb, states it felt like it had fallen asleep, tried to "shake it out" but symptoms persisted for several minutes and then pt states her right foot started twitching. Pt states she became very nervous, hyperventilated, and felt twitching sensation throughout her entire body for several minutes. Pt now c/o headache. Pt denies dizziness, chest pain, SOB, worsening cough, abd pain, N/V/D, fever/chills, back pain.

## 2023-11-14 NOTE — H&P ADULT - PROBLEM SELECTOR PLAN 3
Patient with pulmonary TB diagnosed at the end of March 2023 on treatment for one year  Continue Ethambutol, Rifampin, Moxifloxacin  ID consult if needed

## 2023-11-14 NOTE — ED PROVIDER NOTE - CLINICAL SUMMARY MEDICAL DECISION MAKING FREE TEXT BOX
Patient complaining of intermittent numbness tingling to her right leg for at least a few weeks to months, had room spinning dizziness intermittent for the past few days and then twitching which began in her right foot and then spread to her entire body for few minutes.  Patient complaining of mild headache.  Patient denies chest pain short of breath cough abdominal pain fevers chills nausea vomiting.  Patient reports feeling improved at this time    Plan EKG CT head CTA head and neck lower extremity Doppler labs for labs IV fluid     Differential including but not limited to ICH CVA DVT electrolyte abnormality

## 2023-11-14 NOTE — ED PROVIDER NOTE - DIFFERENTIAL DIAGNOSIS
Differential including but not limited to ICH CVA DVT electrolyte abnormality Differential Diagnosis

## 2023-11-14 NOTE — ED ADULT TRIAGE NOTE - CHIEF COMPLAINT QUOTE
Pt states her right foot went numb/"went to sleep" then her foot was shaking and she fell. Pt also reports shortness of breath and nausea during this time. Pt states episode lasted approximately 1 minute. Pt denies LOC, blood thinner use, and head injury.  Note: Taking medication for tuberculosis, approximately 8 months, started in March.

## 2023-11-15 ENCOUNTER — TRANSCRIPTION ENCOUNTER (OUTPATIENT)
Age: 38
End: 2023-11-15

## 2023-11-15 LAB
ALBUMIN SERPL ELPH-MCNC: 3.7 G/DL — SIGNIFICANT CHANGE UP (ref 3.3–5)
ALBUMIN SERPL ELPH-MCNC: 3.7 G/DL — SIGNIFICANT CHANGE UP (ref 3.3–5)
ALP SERPL-CCNC: 96 U/L — SIGNIFICANT CHANGE UP (ref 40–120)
ALP SERPL-CCNC: 96 U/L — SIGNIFICANT CHANGE UP (ref 40–120)
ALT FLD-CCNC: 18 U/L — SIGNIFICANT CHANGE UP (ref 12–78)
ALT FLD-CCNC: 18 U/L — SIGNIFICANT CHANGE UP (ref 12–78)
ANION GAP SERPL CALC-SCNC: 2 MMOL/L — LOW (ref 5–17)
ANION GAP SERPL CALC-SCNC: 2 MMOL/L — LOW (ref 5–17)
AST SERPL-CCNC: 13 U/L — LOW (ref 15–37)
AST SERPL-CCNC: 13 U/L — LOW (ref 15–37)
BILIRUB DIRECT SERPL-MCNC: <0.1 MG/DL — SIGNIFICANT CHANGE UP (ref 0–0.3)
BILIRUB DIRECT SERPL-MCNC: <0.1 MG/DL — SIGNIFICANT CHANGE UP (ref 0–0.3)
BILIRUB INDIRECT FLD-MCNC: >0.1 MG/DL — LOW (ref 0.2–1)
BILIRUB INDIRECT FLD-MCNC: >0.1 MG/DL — LOW (ref 0.2–1)
BILIRUB SERPL-MCNC: 0.2 MG/DL — SIGNIFICANT CHANGE UP (ref 0.2–1.2)
BILIRUB SERPL-MCNC: 0.2 MG/DL — SIGNIFICANT CHANGE UP (ref 0.2–1.2)
BUN SERPL-MCNC: 10 MG/DL — SIGNIFICANT CHANGE UP (ref 7–23)
BUN SERPL-MCNC: 10 MG/DL — SIGNIFICANT CHANGE UP (ref 7–23)
CALCIUM SERPL-MCNC: 8.5 MG/DL — SIGNIFICANT CHANGE UP (ref 8.5–10.1)
CALCIUM SERPL-MCNC: 8.5 MG/DL — SIGNIFICANT CHANGE UP (ref 8.5–10.1)
CHLORIDE SERPL-SCNC: 112 MMOL/L — HIGH (ref 96–108)
CHLORIDE SERPL-SCNC: 112 MMOL/L — HIGH (ref 96–108)
CO2 SERPL-SCNC: 28 MMOL/L — SIGNIFICANT CHANGE UP (ref 22–31)
CO2 SERPL-SCNC: 28 MMOL/L — SIGNIFICANT CHANGE UP (ref 22–31)
CREAT SERPL-MCNC: 0.77 MG/DL — SIGNIFICANT CHANGE UP (ref 0.5–1.3)
CREAT SERPL-MCNC: 0.77 MG/DL — SIGNIFICANT CHANGE UP (ref 0.5–1.3)
EGFR: 101 ML/MIN/1.73M2 — SIGNIFICANT CHANGE UP
EGFR: 101 ML/MIN/1.73M2 — SIGNIFICANT CHANGE UP
GLUCOSE BLDC GLUCOMTR-MCNC: 96 MG/DL — SIGNIFICANT CHANGE UP (ref 70–99)
GLUCOSE BLDC GLUCOMTR-MCNC: 96 MG/DL — SIGNIFICANT CHANGE UP (ref 70–99)
GLUCOSE SERPL-MCNC: 84 MG/DL — SIGNIFICANT CHANGE UP (ref 70–99)
GLUCOSE SERPL-MCNC: 84 MG/DL — SIGNIFICANT CHANGE UP (ref 70–99)
HCT VFR BLD CALC: 38.7 % — SIGNIFICANT CHANGE UP (ref 34.5–45)
HCT VFR BLD CALC: 38.7 % — SIGNIFICANT CHANGE UP (ref 34.5–45)
HGB BLD-MCNC: 13.2 G/DL — SIGNIFICANT CHANGE UP (ref 11.5–15.5)
HGB BLD-MCNC: 13.2 G/DL — SIGNIFICANT CHANGE UP (ref 11.5–15.5)
MAGNESIUM SERPL-MCNC: 2.3 MG/DL — SIGNIFICANT CHANGE UP (ref 1.6–2.6)
MAGNESIUM SERPL-MCNC: 2.3 MG/DL — SIGNIFICANT CHANGE UP (ref 1.6–2.6)
MCHC RBC-ENTMCNC: 28.4 PG — SIGNIFICANT CHANGE UP (ref 27–34)
MCHC RBC-ENTMCNC: 28.4 PG — SIGNIFICANT CHANGE UP (ref 27–34)
MCHC RBC-ENTMCNC: 34.1 GM/DL — SIGNIFICANT CHANGE UP (ref 32–36)
MCHC RBC-ENTMCNC: 34.1 GM/DL — SIGNIFICANT CHANGE UP (ref 32–36)
MCV RBC AUTO: 83.4 FL — SIGNIFICANT CHANGE UP (ref 80–100)
MCV RBC AUTO: 83.4 FL — SIGNIFICANT CHANGE UP (ref 80–100)
NRBC # BLD: 0 /100 WBCS — SIGNIFICANT CHANGE UP (ref 0–0)
NRBC # BLD: 0 /100 WBCS — SIGNIFICANT CHANGE UP (ref 0–0)
PLATELET # BLD AUTO: 195 K/UL — SIGNIFICANT CHANGE UP (ref 150–400)
PLATELET # BLD AUTO: 195 K/UL — SIGNIFICANT CHANGE UP (ref 150–400)
POTASSIUM SERPL-MCNC: 3.8 MMOL/L — SIGNIFICANT CHANGE UP (ref 3.5–5.3)
POTASSIUM SERPL-MCNC: 3.8 MMOL/L — SIGNIFICANT CHANGE UP (ref 3.5–5.3)
POTASSIUM SERPL-SCNC: 3.8 MMOL/L — SIGNIFICANT CHANGE UP (ref 3.5–5.3)
POTASSIUM SERPL-SCNC: 3.8 MMOL/L — SIGNIFICANT CHANGE UP (ref 3.5–5.3)
PROT SERPL-MCNC: 7.4 G/DL — SIGNIFICANT CHANGE UP (ref 6–8.3)
PROT SERPL-MCNC: 7.4 G/DL — SIGNIFICANT CHANGE UP (ref 6–8.3)
RBC # BLD: 4.64 M/UL — SIGNIFICANT CHANGE UP (ref 3.8–5.2)
RBC # BLD: 4.64 M/UL — SIGNIFICANT CHANGE UP (ref 3.8–5.2)
RBC # FLD: 13 % — SIGNIFICANT CHANGE UP (ref 10.3–14.5)
RBC # FLD: 13 % — SIGNIFICANT CHANGE UP (ref 10.3–14.5)
SODIUM SERPL-SCNC: 142 MMOL/L — SIGNIFICANT CHANGE UP (ref 135–145)
SODIUM SERPL-SCNC: 142 MMOL/L — SIGNIFICANT CHANGE UP (ref 135–145)
WBC # BLD: 5.6 K/UL — SIGNIFICANT CHANGE UP (ref 3.8–10.5)
WBC # BLD: 5.6 K/UL — SIGNIFICANT CHANGE UP (ref 3.8–10.5)
WBC # FLD AUTO: 5.6 K/UL — SIGNIFICANT CHANGE UP (ref 3.8–10.5)
WBC # FLD AUTO: 5.6 K/UL — SIGNIFICANT CHANGE UP (ref 3.8–10.5)

## 2023-11-15 PROCEDURE — 71275 CT ANGIOGRAPHY CHEST: CPT | Mod: 26

## 2023-11-15 PROCEDURE — 70553 MRI BRAIN STEM W/O & W/DYE: CPT | Mod: 26

## 2023-11-15 PROCEDURE — 93010 ELECTROCARDIOGRAM REPORT: CPT

## 2023-11-15 RX ORDER — MOXIFLOXACIN HYDROCHLORIDE TABLETS, 400 MG 400 MG/1
400 TABLET, FILM COATED ORAL DAILY
Refills: 0 | Status: DISCONTINUED | OUTPATIENT
Start: 2023-11-16 | End: 2023-11-17

## 2023-11-15 RX ORDER — LEVETIRACETAM 250 MG/1
1000 TABLET, FILM COATED ORAL
Refills: 0 | Status: DISCONTINUED | OUTPATIENT
Start: 2023-11-15 | End: 2023-11-15

## 2023-11-15 RX ORDER — LEVETIRACETAM 250 MG/1
1000 TABLET, FILM COATED ORAL DAILY
Refills: 0 | Status: DISCONTINUED | OUTPATIENT
Start: 2023-11-15 | End: 2023-11-15

## 2023-11-15 RX ORDER — LEVETIRACETAM 250 MG/1
500 TABLET, FILM COATED ORAL EVERY 12 HOURS
Refills: 0 | Status: DISCONTINUED | OUTPATIENT
Start: 2023-11-15 | End: 2023-11-17

## 2023-11-15 RX ORDER — ETHAMBUTOL HYDROCHLORIDE 400 MG/1
10 TABLET, FILM COATED ORAL
Qty: 0 | Refills: 0 | DISCHARGE
Start: 2023-11-15

## 2023-11-15 RX ADMIN — Medication 1 MILLIGRAM(S): at 11:30

## 2023-11-15 RX ADMIN — ETHAMBUTOL HYDROCHLORIDE 1000 MILLIGRAM(S): 400 TABLET, FILM COATED ORAL at 11:42

## 2023-11-15 RX ADMIN — LEVETIRACETAM 400 MILLIGRAM(S): 250 TABLET, FILM COATED ORAL at 11:34

## 2023-11-15 RX ADMIN — ENOXAPARIN SODIUM 40 MILLIGRAM(S): 100 INJECTION SUBCUTANEOUS at 21:08

## 2023-11-15 RX ADMIN — LEVETIRACETAM 500 MILLIGRAM(S): 250 TABLET, FILM COATED ORAL at 21:07

## 2023-11-15 NOTE — CARE COORDINATION ASSESSMENT. - PRO ARRIVE FROM
Per pt  lives with spouse and 2 children ages 5 and 7 and independent w ADL, ambulation, driving and med management prior to admission. Pt drives to appointments, has 8 stairs to enter home, 0 steps inside stated everything is on same level, denies community services, denies DME or need for usage/home

## 2023-11-15 NOTE — CARE COORDINATION ASSESSMENT. - NSCAREPROVIDERS_GEN_ALL_CORE_FT
CARE PROVIDERS:  Accepting Physician: Gibson Lucas  Administration: Ramiro Teague  Administration: Carlos Velasquez  Administration: Afsaneh Hinds  Admitting: Gibson Lucas  Attending: Gibson Lucas  Cardiology Technician: Clare Huynh  Case Management: Montez Banegas  Case Management: Beny Vilchis  Consultant: Collins Andrews  Consultant: Weil, Patricia  Consultant: Norbert Keyes  Covering Team: Gibson Lucas  Covering Team: Kumar Bell  ED ACP: Ria Whitehead  ED Attending: Coleen Castelan  ED Nurse: Luis Nolen  Emergency Medicine: Aroldo Kan  Emergency Medicine: Homer Bennett  Nurse: Lydia Carlin  Nurse: Katarzyna Barger  Ordered: ADM, User  Override: Katarzyna Barger  Override: Lydia Carlin  Override: Jacquelyn Davis  PCA/Nursing Assistant: Erin Bernal  PCA/Nursing Assistant: Jennifer Wilder  PCA/Nursing Assistant: Pooja Alcantara  Primary Team: Angelina Crawley  Primary Team: Albina Nunes  Primary Team: Kelsy Lott  Quality Review: Daxa Yoder  Registered Dietitian: Lucy Neff

## 2023-11-15 NOTE — DISCHARGE NOTE PROVIDER - NSDCMRMEDTOKEN_GEN_ALL_CORE_FT
ethambutol 100 mg oral tablet: 10 tab(s) orally once a day  ethambutol 100 mg oral tablet: 10 tab(s) orally once a day  moxifloxacin 400 mg oral tablet: 1 tab(s) orally once a day  polyethylene glycol 3350 oral powder for reconstitution: 17 gram(s) orally once a day  rifAMPin 300 mg oral capsule: 2 cap(s) orally once a day   dexAMETHasone 4 mg oral tablet: 1 tab(s) orally 3 times a day take 1 tab 3x day for one week, then 1 tab 2x day for one week and then one tab daily for one week, STOP  ethambutol 100 mg oral tablet: 10 tab(s) orally once a day  Keppra 750 mg oral tablet: 1 tab(s) orally 2 times a day  moxifloxacin 400 mg oral tablet: 1 tab(s) orally once a day  PHYSICAL THERAPY: 1 MDD: 1  rifAMPin 300 mg oral capsule: 2 cap(s) orally once a day

## 2023-11-15 NOTE — CARE COORDINATION ASSESSMENT. - OTHER PERTINENT DISCHARGE PLANNING INFORMATION:
Met patient at bedside.  Explained role of CM, verbalized understanding. Pt was made aware a CM will remain available through hospitalization.  Contact information given in discharge/ transitions resource folder. Per pt  lives with spouse and 2 children ages 5 and 7 and independent w ADL, ambulation, driving and med management prior to admission. Pt drives to appointments, has 8 stairs to enter home, 0 steps inside stated everything is on same level, denies community services, denies DME or need for usage. CM verified: PCP: DR. Anuradha Salomon Pharmacy: [facility]. Insurance: medicaid .: stated no.

## 2023-11-15 NOTE — DISCHARGE NOTE PROVIDER - HOSPITAL COURSE
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 Pt reports intermittent numbness/tingling to her right leg x months since starting her regimen. Today, pt reports episode this AM when her right leg went numb, states it felt like it had fallen asleep, tried to "shake it out" but symptoms persisted for several minutes and then pt states her right foot started twitching. Pt states she became very nervous, hyperventilated, and felt twitching sensation throughout her entire body for several minutes. Pt now c/o headache. Pt denies dizziness, chest pain, SOB, worsening cough, abd pain, N/V/D, fever/chills, back pain. Patient reports that she's had pleuritic CP at the base of her lungs since TB diagnosis but it has gotten slightly worse over the past week.     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 Pt reports intermittent numbness/tingling to her right leg x months since starting her regimen. Today, pt reports episode this AM when her right leg went numb, states it felt like it had fallen asleep, tried to "shake it out" but symptoms persisted for several minutes and then pt states her right foot started twitching. Pt states she became very nervous, hyperventilated, and felt twitching sensation throughout her entire body for several minutes. Pt now c/o headache. Pt denies dizziness, chest pain, SOB, worsening cough, abd pain, N/V/D, fever/chills, back pain. Patient reports that she's had pleuritic CP at the base of her lungs since TB diagnosis but it has gotten slightly worse over the past week.     Abnormal CT head  MRI +meningioma with edema  EEG normal  Likely patient with focal seizures  Patient started on keppra and decadron  Needs outpatient neurosurgery and ID f/u        >35 minutes spent on discharge

## 2023-11-15 NOTE — CHART NOTE - NSCHARTNOTEFT_GEN_A_CORE
Resident Rapid Response Note    Rapid response was called at 9:59am for R leg shaking and numbness.    Events leading up to Rapid Response: Pt had just completed MRI BRAIN for her presenting concern of R LE numbness and weakness. When being wheeled out of the MR room, pt began having RLE shaking and numbness. No change in mental status occurred, no incontinence. Episode resolved by the time rapid response team arrived. Pt states they are now back to her baseline since arriving to hospital with similar level of R ankle pain and RLE numbness. Pt recently read that her current abx regiment can cause seizures as a possible side effect and was concerned episode was due to this.     Patient was seen and examined at the bedside by the rapid response team. Dr. Peralta and Dr. Bell at bedside.    Rapid Response Vital Signs:  BP: 132/81  HR: 86  RR: 12  SpO2: 100% on RA   Temp: 98.4 (oral)  FS: 96        Physical Exam:  Gen: anxious appearing, tearful, NAD  HEENT: NCAT, PEERLA b/l, EOMI b/l  Cardio: regular rate and rhythm, +s1s2, no murmurs, rubs, or gallops  Pulm: CTA b/l, no wheezes, rales or rhonchi. 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 Rapid response called for RLE shaking/numbness.     - Acute worsening and rapid resolution of RLE shaking/numbness likely 2/2 acute anxiety. No concern for acute neurologic event given stable neurologic exam on encounter  - no intervention was performed at this time  - Pt's morning labs are pending collection, will f/u CMP for potential electrolyte abnormalities are replete as necessary  - will reassess pt for stability of symptoms when returned to floor from MR  - will review MR BRAIN when results available          -RN to call if any changes.  -Discussed with Dr. Lucas, agrees with above plan  -Family updated by Dr Bell Resident Rapid Response Note    Rapid response was called at 9:59am for R leg shaking and numbness.    Events leading up to Rapid Response: Pt had just completed MRI BRAIN for her presenting concern of R LE numbness and weakness. When being wheeled out of the MR room, pt began having RLE shaking and numbness. No change in mental status occurred, no incontinence. Episode resolved by the time rapid response team arrived. Pt states they are now back to her baseline since arriving to hospital with similar level of R ankle pain and RLE numbness. Pt recently read that her current abx regiment can cause seizures as a possible side effect and was concerned episode was due to this.     Patient was seen and examined at the bedside by the rapid response team. Dr. Peralta and Dr. Bell at bedside.    Rapid Response Vital Signs:  BP: 132/81  HR: 86  RR: 12  SpO2: 100% on RA   Temp: 98.4 (oral)  FS: 96        Physical Exam:  Gen: anxious appearing, tearful, NAD  HEENT: NCAT, PEERLA b/l, EOMI b/l  Cardio: regular rate and rhythm, +s1s2, no murmurs, rubs, or gallops  Pulm: CTA b/l, no wheezes, rales or rhonchi. 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 Rapid response called for RLE shaking/numbness.     - Acute worsening and rapid resolution of RLE shaking/numbness likely 2/2 acute anxiety. No concern for acute neurologic event given stable neurologic exam on encounter  - no intervention was performed at this time  - Pt's morning labs are pending collection, will f/u CMP for potential electrolyte abnormalities are replete as necessary  - will reassess pt for stability of symptoms when returned to floor from MR  - will review MR BRAIN when results available          -RN to call if any changes.  -Discussed with Dr. 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   Rapid response called for RLE shaking/numbness.   Follow up evaluation of patient 2 hours post rapid response    Patient was seen and examined at the bedside by the rapid response team. In the interim, MR BRAIN showed Posterior paramedian left frontal lobe enhancing lesion with moderate adjacent vasogenic edema, suggesting meningioma, . Neuro was made aware and pt was keppra loaded to be started on BID therapy tomorrow. Pt had additional episode of RLE shaking, this time preceded by a lightheaded/"abnormal" sensation. Episode lasted ~1min and shaking and lighteadedness stopped as well. Pt was given stat ativan 1mg IVP. Pt was made aware of the radiologic findings, neurology plan, and plan for f/u with neurosurgery. NP Angelina at bedside informing pt and family of updates. Now s/p keppra load and stat ativan and appears well with no acute complaints.    Allergies    amoxicillin (Rash)    Intolerances        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          Vital Signs Last 24 Hrs  T(C): 36.8 (15 Nov 2023 12:26), Max: 36.9 (14 Nov 2023 16:31)  T(F): 98.3 (15 Nov 2023 12:26), Max: 98.5 (14 Nov 2023 16:31)  HR: 90 (15 Nov 2023 12:26) (77 - 94)  BP: 100/64 (15 Nov 2023 12:26) (99/65 - 128/74)  BP(mean): --  RR: 18 (15 Nov 2023 12:26) (16 - 20)  SpO2: 96% (15 Nov 2023 12:26) (96% - 99%)    Parameters below as of 15 Nov 2023 12:26  Patient On (Oxygen Delivery Method): room air              Gen: well appearing, tearful, NAD  HEENT: NCAT, PEERLA b/l, EOMI b/l  Cardio: regular rate and rhythm, +s1s2, no murmurs, rubs, or gallops  Pulm: CTA b/l, no wheezes, rales or rhonchi. +pleuritic chest pain  Abdomen: soft, nontender, nondistended, +BS x4 quadrants, no guarding  Extremities: mild swelling of R lateral malleolus with reduced ROM, +2 pedal pulses  Neuro: AAOx3, CNII-XII intact, 4/5 R ankle dorsiflexion, 4/5 R knee flexion and extension, 4/5 R hip flexion, 5/5 strength all other extremities, decreased sensation on RLE  Skin: warm and dry                            14.4   7.20  )-----------( 202      ( 14 Nov 2023 14:13 )             42.4     11-14    144  |  111<H>  |  12  ----------------------------<  83  3.5   |  28  |  0.94    Ca    9.0      14 Nov 2023 14:13  Mg     2.6     11-14    TPro  8.6<H>  /  Alb  4.1  /  TBili  0.7  /  DBili  x   /  AST  19  /  ALT  19  /  AlkPhos  109  11-14         LIVER FUNCTIONS - ( 14 Nov 2023 14:13 )  Alb: 4.1 g/dL / Pro: 8.6 g/dL / ALK PHOS: 109 U/L / ALT: 19 U/L / AST: 19 U/L / GGT: x         Urinalysis Basic - ( 14 Nov 2023 14:13 )    Color: x / Appearance: x / SG: x / pH: x  Gluc: 83 mg/dL / Ketone: x  / Bili: x / Urobili: x   Blood: x / Protein: x / Nitrite: x   Leuk Esterase: x / RBC: x / WBC x   Sq Epi: x / Non Sq Epi: x / Bacteria: x             MEDICATIONS  (STANDING):  enoxaparin Injectable 40 milliGRAM(s) SubCutaneous every 24 hours  ethambutol 1000 milliGRAM(s) Oral daily  levETIRAcetam 500 milliGRAM(s) Oral two times a day  levoFLOXacin  Tablet 750 milliGRAM(s) Oral every 24 hours  rifAMPin 600 milliGRAM(s) Oral daily  sodium chloride 0.9%. 1000 milliLiter(s) (60 mL/Hr) IV Continuous <Continuous>    MEDICATIONS  (PRN):  acetaminophen     Tablet .. 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     - neuro is aware and has completed keppra loading with plan to start maintenance therapy  - pt and family made aware of findings and need for neurosurgery follow up  - pt current well appearing and stable labs and vitals  - Dr. Lucas made aware by CRISTINA Alfaro  - no need for additional intervention at this time Resident Rapid Response Note    Rapid response was called at 9:59am for R leg shaking and numbness.    Events leading up to Rapid Response: Pt had just completed MRI BRAIN for her presenting concern of R LE numbness and weakness. When being wheeled out of the MR room, pt began having RLE shaking and numbness. No change in mental status occurred, no incontinence. Episode resolved by the time rapid response team arrived. Pt states they are now back to her baseline since arriving to hospital with similar level of R ankle pain and RLE numbness. Pt recently read that her current abx regiment can cause seizures as a possible side effect and was concerned episode was due to this.     Patient was seen and examined at the bedside by the rapid response team. Dr. Peralta and Dr. Bell at bedside.    Rapid Response Vital Signs:  BP: 132/81  HR: 86  RR: 12  SpO2: 100% on RA   Temp: 98.4 (oral)  FS: 96        Physical Exam:  Gen: anxious appearing, tearful, NAD  HEENT: NCAT, PEERLA b/l, EOMI b/l  Cardio: regular rate and rhythm, +s1s2, no murmurs, rubs, or gallops  Pulm: CTA b/l, no wheezes, rales or rhonchi. 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 Rapid response called for RLE shaking/numbness.     - Acute worsening and rapid resolution of RLE shaking/numbness likely 2/2 acute anxiety. No concern for acute neurologic event given stable neurologic exam on encounter  - no intervention was performed at this time  - Pt's morning labs are pending collection, will f/u CMP for potential electrolyte abnormalities are replete as necessary  - will reassess pt for stability of symptoms when returned to floor from MR  - will review MR BRAIN when results available          -RN to call if any changes.  -Discussed with Dr. 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   Rapid response called for RLE shaking/numbness.   Follow up evaluation of patient 2 hours post rapid response    Patient was seen and examined at the bedside by the rapid response team. In the interim, MR BRAIN showed Posterior paramedian left frontal lobe enhancing lesion with moderate adjacent vasogenic edema, suggesting meningioma, . Neuro was made aware and pt was keppra loaded to be started on BID therapy tomorrow. Pt had additional episode of RLE shaking, this time preceded by a lightheaded/"abnormal" sensation. Episode lasted ~1min and shaking and lighteadedness stopped as well. Pt was given stat ativan 1mg IVP. Pt was made aware of the radiologic findings, neurology plan, and plan for f/u with neurosurgery. CRISTINA Alfaro at bedside informing pt and family of updates. Now s/p keppra load and stat ativan and appears well with no acute complaints.    Allergies    amoxicillin (Rash)    Intolerances        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          Vital Signs Last 24 Hrs  T(C): 36.8 (15 Nov 2023 12:26), Max: 36.9 (14 Nov 2023 16:31)  T(F): 98.3 (15 Nov 2023 12:26), Max: 98.5 (14 Nov 2023 16:31)  HR: 90 (15 Nov 2023 12:26) (77 - 94)  BP: 100/64 (15 Nov 2023 12:26) (99/65 - 128/74)  BP(mean): --  RR: 18 (15 Nov 2023 12:26) (16 - 20)  SpO2: 96% (15 Nov 2023 12:26) (96% - 99%)    Parameters below as of 15 Nov 2023 12:26  Patient On (Oxygen Delivery Method): room air              Gen: well appearing, tearful, NAD  HEENT: NCAT, PEERLA b/l, EOMI b/l  Cardio: regular rate and rhythm, +s1s2, no murmurs, rubs, or gallops  Pulm: CTA b/l, no wheezes, rales or rhonchi. +pleuritic chest pain  Abdomen: soft, nontender, nondistended, +BS x4 quadrants, no guarding  Extremities: mild swelling of R lateral malleolus with reduced ROM, +2 pedal pulses  Neuro: AAOx3, CNII-XII intact, 4/5 R ankle dorsiflexion, 4/5 R knee flexion and extension, 4/5 R hip flexion, 5/5 strength all other extremities, decreased sensation on RLE  Skin: warm and dry                            14.4   7.20  )-----------( 202      ( 14 Nov 2023 14:13 )             42.4     11-14    144  |  111<H>  |  12  ----------------------------<  83  3.5   |  28  |  0.94    Ca    9.0      14 Nov 2023 14:13  Mg     2.6     11-14    TPro  8.6<H>  /  Alb  4.1  /  TBili  0.7  /  DBili  x   /  AST  19  /  ALT  19  /  AlkPhos  109  11-14         LIVER FUNCTIONS - ( 14 Nov 2023 14:13 )  Alb: 4.1 g/dL / Pro: 8.6 g/dL / ALK PHOS: 109 U/L / ALT: 19 U/L / AST: 19 U/L / GGT: x         Urinalysis Basic - ( 14 Nov 2023 14:13 )    Color: x / Appearance: x / SG: x / pH: x  Gluc: 83 mg/dL / Ketone: x  / Bili: x / Urobili: x   Blood: x / Protein: x / Nitrite: x   Leuk Esterase: x / RBC: x / WBC x   Sq Epi: x / Non Sq Epi: x / Bacteria: x             MEDICATIONS  (STANDING):  enoxaparin Injectable 40 milliGRAM(s) SubCutaneous every 24 hours  ethambutol 1000 milliGRAM(s) Oral daily  levETIRAcetam 500 milliGRAM(s) Oral two times a day  levoFLOXacin  Tablet 750 milliGRAM(s) Oral every 24 hours  rifAMPin 600 milliGRAM(s) Oral daily  sodium chloride 0.9%. 1000 milliLiter(s) (60 mL/Hr) IV Continuous <Continuous>    MEDICATIONS  (PRN):  acetaminophen     Tablet .. 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     - neuro is aware and has completed keppra loading with plan to start maintenance therapy  - pt and family made aware of findings and need for neurosurgery follow up  - pt current well appearing and stable labs and vitals  - Dr. Lucas made aware by NP Angelina  - no need for additional intervention at this time

## 2023-11-15 NOTE — DISCHARGE NOTE PROVIDER - NSDCCPCAREPLAN_GEN_ALL_CORE_FT
PRINCIPAL DISCHARGE DIAGNOSIS  Diagnosis: Focal seizures  Assessment and Plan of Treatment: Continue current medications  Follow-up with your primary care doctor and neurologist within 1 week.        SECONDARY DISCHARGE DIAGNOSES  Diagnosis: Meningioma  Assessment and Plan of Treatment: Follow-up with your primary care doctor within 1 week.   Follow-up with neurosurgeon within 1-2 weeks    Diagnosis: Infection due to Mycobacterium bovis  Assessment and Plan of Treatment: Continue current medications  Follow-up with your primary care doctor within 1 week.     PRINCIPAL DISCHARGE DIAGNOSIS  Diagnosis: Focal seizures  Assessment and Plan of Treatment: Continue current medications  Follow-up with your primary care doctor and neurologist within 1 week.        SECONDARY DISCHARGE DIAGNOSES  Diagnosis: Meningioma  Assessment and Plan of Treatment: Follow-up with your primary care doctor within 1 week.   Follow-up with neurosurgeon within 1-2 weeks    Diagnosis: Infection due to Mycobacterium bovis  Assessment and Plan of Treatment: Continue current medications  Follow-up with your infectious disease doctor within 1 week.

## 2023-11-15 NOTE — DISCHARGE NOTE PROVIDER - CARE PROVIDER_API CALL
primary care doctor,   Phone: (   )    -  Fax: (   )    -  Follow Up Time: 1-3 days   Umm Mcnamara  [place] [address]  Phone: ()-  Fax: ()-  Established Patient  Follow Up Time: 1 week    Kenneth Lopez  Neurosurgery  [address] [facility]  [place] [address]  Phone: [phone]  Fax: [phone]  Follow Up Time: 1 week    primary care doctor,   Phone: (   )    -  Fax: (   )    -  Follow Up Time: 1-3 days    Norbert Keyes  Neurology  [address] [place] [id number]  Phone: [phone]  Fax: [phone]  Follow Up Time: 1 week

## 2023-11-15 NOTE — DISCHARGE NOTE PROVIDER - CARE PROVIDERS DIRECT ADDRESSES
,DirectAddress_Unknown ,DirectAddress_Unknown,[email or web address],DirectAddress_Unknown,DirectAddress_Unknown

## 2023-11-15 NOTE — CONSULT NOTE ADULT - ASSESSMENT
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

## 2023-11-15 NOTE — DISCHARGE NOTE PROVIDER - PROVIDER TOKENS
FREE:[LAST:[primary care doctor],PHONE:[(   )    -],FAX:[(   )    -],FOLLOWUP:[1-3 days]] PROVIDER:[TOKEN:[[id number]],FOLLOWUP:[1 week],ESTABLISHEDPATIENT:[T]],PROVIDER:[TOKEN:[[id number]],FOLLOWUP:[1 week]],FREE:[LAST:[primary care doctor],PHONE:[(   )    -],FAX:[(   )    -],FOLLOWUP:[1-3 days]],PROVIDER:[TOKEN:[[id number]],FOLLOWUP:[1 week]]

## 2023-11-15 NOTE — PATIENT CHOICE NOTE. - NSPTCHOICENOTES_GEN_ALL_CORE
Unsure of discharge needs at present. D/C resource folder provided at bedside this includes lists for both rehab facilities and home care agencies.

## 2023-11-16 LAB
A1C WITH ESTIMATED AVERAGE GLUCOSE RESULT: 5.1 % — SIGNIFICANT CHANGE UP (ref 4–5.6)
A1C WITH ESTIMATED AVERAGE GLUCOSE RESULT: 5.1 % — SIGNIFICANT CHANGE UP (ref 4–5.6)
ANION GAP SERPL CALC-SCNC: 5 MMOL/L — SIGNIFICANT CHANGE UP (ref 5–17)
ANION GAP SERPL CALC-SCNC: 5 MMOL/L — SIGNIFICANT CHANGE UP (ref 5–17)
BUN SERPL-MCNC: 11 MG/DL — SIGNIFICANT CHANGE UP (ref 7–23)
BUN SERPL-MCNC: 11 MG/DL — SIGNIFICANT CHANGE UP (ref 7–23)
CALCIUM SERPL-MCNC: 8.2 MG/DL — LOW (ref 8.5–10.1)
CALCIUM SERPL-MCNC: 8.2 MG/DL — LOW (ref 8.5–10.1)
CHLORIDE SERPL-SCNC: 114 MMOL/L — HIGH (ref 96–108)
CHLORIDE SERPL-SCNC: 114 MMOL/L — HIGH (ref 96–108)
CHOLEST SERPL-MCNC: 150 MG/DL — SIGNIFICANT CHANGE UP
CHOLEST SERPL-MCNC: 150 MG/DL — SIGNIFICANT CHANGE UP
CO2 SERPL-SCNC: 25 MMOL/L — SIGNIFICANT CHANGE UP (ref 22–31)
CO2 SERPL-SCNC: 25 MMOL/L — SIGNIFICANT CHANGE UP (ref 22–31)
CREAT SERPL-MCNC: 0.89 MG/DL — SIGNIFICANT CHANGE UP (ref 0.5–1.3)
CREAT SERPL-MCNC: 0.89 MG/DL — SIGNIFICANT CHANGE UP (ref 0.5–1.3)
EGFR: 85 ML/MIN/1.73M2 — SIGNIFICANT CHANGE UP
EGFR: 85 ML/MIN/1.73M2 — SIGNIFICANT CHANGE UP
ESTIMATED AVERAGE GLUCOSE: 100 MG/DL — SIGNIFICANT CHANGE UP (ref 68–114)
ESTIMATED AVERAGE GLUCOSE: 100 MG/DL — SIGNIFICANT CHANGE UP (ref 68–114)
GLUCOSE SERPL-MCNC: 108 MG/DL — HIGH (ref 70–99)
GLUCOSE SERPL-MCNC: 108 MG/DL — HIGH (ref 70–99)
HCT VFR BLD CALC: 36.7 % — SIGNIFICANT CHANGE UP (ref 34.5–45)
HCT VFR BLD CALC: 36.7 % — SIGNIFICANT CHANGE UP (ref 34.5–45)
HDLC SERPL-MCNC: 30 MG/DL — LOW
HDLC SERPL-MCNC: 30 MG/DL — LOW
HGB BLD-MCNC: 12.7 G/DL — SIGNIFICANT CHANGE UP (ref 11.5–15.5)
HGB BLD-MCNC: 12.7 G/DL — SIGNIFICANT CHANGE UP (ref 11.5–15.5)
LIPID PNL WITH DIRECT LDL SERPL: 103 MG/DL — HIGH
LIPID PNL WITH DIRECT LDL SERPL: 103 MG/DL — HIGH
MAGNESIUM SERPL-MCNC: 2.3 MG/DL — SIGNIFICANT CHANGE UP (ref 1.6–2.6)
MAGNESIUM SERPL-MCNC: 2.3 MG/DL — SIGNIFICANT CHANGE UP (ref 1.6–2.6)
MCHC RBC-ENTMCNC: 28.3 PG — SIGNIFICANT CHANGE UP (ref 27–34)
MCHC RBC-ENTMCNC: 28.3 PG — SIGNIFICANT CHANGE UP (ref 27–34)
MCHC RBC-ENTMCNC: 34.6 GM/DL — SIGNIFICANT CHANGE UP (ref 32–36)
MCHC RBC-ENTMCNC: 34.6 GM/DL — SIGNIFICANT CHANGE UP (ref 32–36)
MCV RBC AUTO: 81.7 FL — SIGNIFICANT CHANGE UP (ref 80–100)
MCV RBC AUTO: 81.7 FL — SIGNIFICANT CHANGE UP (ref 80–100)
NON HDL CHOLESTEROL: 120 MG/DL — SIGNIFICANT CHANGE UP
NON HDL CHOLESTEROL: 120 MG/DL — SIGNIFICANT CHANGE UP
NRBC # BLD: 0 /100 WBCS — SIGNIFICANT CHANGE UP (ref 0–0)
NRBC # BLD: 0 /100 WBCS — SIGNIFICANT CHANGE UP (ref 0–0)
PLATELET # BLD AUTO: 183 K/UL — SIGNIFICANT CHANGE UP (ref 150–400)
PLATELET # BLD AUTO: 183 K/UL — SIGNIFICANT CHANGE UP (ref 150–400)
POTASSIUM SERPL-MCNC: 3.7 MMOL/L — SIGNIFICANT CHANGE UP (ref 3.5–5.3)
POTASSIUM SERPL-MCNC: 3.7 MMOL/L — SIGNIFICANT CHANGE UP (ref 3.5–5.3)
POTASSIUM SERPL-SCNC: 3.7 MMOL/L — SIGNIFICANT CHANGE UP (ref 3.5–5.3)
POTASSIUM SERPL-SCNC: 3.7 MMOL/L — SIGNIFICANT CHANGE UP (ref 3.5–5.3)
RBC # BLD: 4.49 M/UL — SIGNIFICANT CHANGE UP (ref 3.8–5.2)
RBC # BLD: 4.49 M/UL — SIGNIFICANT CHANGE UP (ref 3.8–5.2)
RBC # FLD: 12.5 % — SIGNIFICANT CHANGE UP (ref 10.3–14.5)
RBC # FLD: 12.5 % — SIGNIFICANT CHANGE UP (ref 10.3–14.5)
SODIUM SERPL-SCNC: 144 MMOL/L — SIGNIFICANT CHANGE UP (ref 135–145)
SODIUM SERPL-SCNC: 144 MMOL/L — SIGNIFICANT CHANGE UP (ref 135–145)
TRIGL SERPL-MCNC: 90 MG/DL — SIGNIFICANT CHANGE UP
TRIGL SERPL-MCNC: 90 MG/DL — SIGNIFICANT CHANGE UP
WBC # BLD: 5.59 K/UL — SIGNIFICANT CHANGE UP (ref 3.8–10.5)
WBC # BLD: 5.59 K/UL — SIGNIFICANT CHANGE UP (ref 3.8–10.5)
WBC # FLD AUTO: 5.59 K/UL — SIGNIFICANT CHANGE UP (ref 3.8–10.5)
WBC # FLD AUTO: 5.59 K/UL — SIGNIFICANT CHANGE UP (ref 3.8–10.5)

## 2023-11-16 PROCEDURE — 99222 1ST HOSP IP/OBS MODERATE 55: CPT

## 2023-11-16 RX ORDER — DEXAMETHASONE 0.5 MG/5ML
4 ELIXIR ORAL EVERY 8 HOURS
Refills: 0 | Status: DISCONTINUED | OUTPATIENT
Start: 2023-11-16 | End: 2023-11-17

## 2023-11-16 RX ADMIN — LEVETIRACETAM 500 MILLIGRAM(S): 250 TABLET, FILM COATED ORAL at 10:29

## 2023-11-16 RX ADMIN — ENOXAPARIN SODIUM 40 MILLIGRAM(S): 100 INJECTION SUBCUTANEOUS at 21:46

## 2023-11-16 RX ADMIN — SODIUM CHLORIDE 60 MILLILITER(S): 9 INJECTION INTRAMUSCULAR; INTRAVENOUS; SUBCUTANEOUS at 21:47

## 2023-11-16 RX ADMIN — Medication 4 MILLIGRAM(S): at 15:21

## 2023-11-16 RX ADMIN — SODIUM CHLORIDE 60 MILLILITER(S): 9 INJECTION INTRAMUSCULAR; INTRAVENOUS; SUBCUTANEOUS at 03:51

## 2023-11-16 RX ADMIN — Medication 4 MILLIGRAM(S): at 21:46

## 2023-11-16 RX ADMIN — LEVETIRACETAM 500 MILLIGRAM(S): 250 TABLET, FILM COATED ORAL at 18:05

## 2023-11-16 NOTE — PHYSICAL THERAPY INITIAL EVALUATION ADULT - PERTINENT HX OF CURRENT PROBLEM, REHAB EVAL
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 Pt reports intermittent numbness/tingling to her right leg x months since starting her regimen. Pt reports episode that her right leg went numb, states it felt like it had fallen asleep, tried to "shake it out" but symptoms persisted for several minutes and then pt states her right foot started twitching. Pt states she became very nervous, hyperventilated, and felt twitching sensation throughout her entire body for several minutes. Pt now c/o headache. Pt denies dizziness, chest pain, SOB, worsening cough, abd pain, N/V/D, fever/chills, back pain.

## 2023-11-16 NOTE — PROVIDER CONTACT NOTE (OTHER) - ASSESSMENT
Pt is a&ox4, unsteady gait, pt able to feel stimuli on bottom of right foot. No drift in extremities. Equal strength in b/l upper and lower extremities.

## 2023-11-16 NOTE — CASE MANAGEMENT PROGRESS NOTE - NSCMPROGRESSNOTE_GEN_ALL_CORE
Discussed pt on rounds, pt remains acute, PENDING EEG. Pending PT eval CM will continue to collaborate with interdisciplinary team and remain available to assist.

## 2023-11-16 NOTE — CONSULT NOTE ADULT - SUBJECTIVE AND OBJECTIVE BOX
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 Pt reports intermittent numbness/tingling to her right leg x months since starting her regimen. Today, pt reports episode this AM when her right leg went numb, states it felt like it had fallen asleep, tried to "shake it out" but symptoms persisted for several minutes and then pt states her right foot started twitching. Pt states she became very nervous, hyperventilated, and felt twitching sensation throughout her entire body for several minutes. Pt now c/o headache. Pt denies dizziness, chest pain, SOB, worsening cough, abd pain, N/V/D, fever/chills, back pain. Patient reports that she's had pleuritic CP at the base of her lungs since TB diagnosis but it has gotten slightly worse over the past week.   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          Medication list         MEDICATIONS  (STANDING):  enoxaparin Injectable 40 milliGRAM(s) SubCutaneous every 24 hours  ethambutol 1000 milliGRAM(s) Oral daily  levoFLOXacin  Tablet 750 milliGRAM(s) Oral every 24 hours  rifAMPin 600 milliGRAM(s) Oral daily  sodium chloride 0.9%. 1000 milliLiter(s) (60 mL/Hr) IV Continuous <Continuous>    MEDICATIONS  (PRN):  acetaminophen     Tablet .. 650 milliGRAM(s) Oral every 6 hours PRN Temp greater or equal to 38C (100.4F), Mild Pain (1 - 3)  aluminum hydroxide/magnesium hydroxide/simethicone Suspension 30 milliLiter(s) Oral every 4 hours PRN Dyspepsia  melatonin 3 milliGRAM(s) Oral at bedtime PRN Insomnia  ondansetron Injectable 4 milliGRAM(s) IV Push every 8 hours PRN Nausea and/or Vomiting         Vitals log        ICU Vital Signs Last 24 Hrs  T(C): 36.7 (2023 22:49), Max: 36.9 (2023 16:31)  T(F): 98.1 (2023 22:49), Max: 98.5 (2023 16:31)  HR: 93 (2023 22:49) (82 - 98)  BP: 100/60 (2023 22:49) (100/60 - 128/74)  BP(mean): --  ABP: --  ABP(mean): --  RR: 19 (2023 22:49) (16 - 20)  SpO2: 96% (2023 22:49) (96% - 99%)    O2 Parameters below as of 2023 22:49  Patient On (Oxygen Delivery Method): room air      Patient History:    Past Medical, Past Surgical, and Family History:  PAST MEDICAL HISTORY:  Infection due to Mycobacterium bovis.     Social History:  · Substance use	No     Tobacco Screening:  · Core Measure Site	Yes  · Has the patient used tobacco in the past 30 days?	No    Risk Assessment:    Present on Admission:  Deep Venous Thrombosis	no  Pulmonary Embolus	no     HIV Screening:  · In accordance with [place] State law, we offer every patient who comes to our ED an HIV test. Would you like to be tested today?	Unable to answer due to medical condition/unresponsive/etc...             Input and Output:  I&O's Detail      Lab Data                        14.4   7.20  )-----------( 202      ( 2023 14:13 )             42.4     11-14    144  |  111<H>  |  12  ----------------------------<  83  3.5   |  28  |  0.94    Ca    9.0      2023 14:13  Mg     2.6     11-14    TPro  8.6<H>  /  Alb  4.1  /  TBili  0.7  /  DBili  x   /  AST  19  /  ALT  19  /  AlkPhos  109  11-14      CARDIAC MARKERS ( 2023 14:13 )  x     / x     / 31 U/L / x     / x            Review of Systems	    neuropathy    Objective     Physical Examination    heart s1s2  lung dc BS      Pertinent Lab findings & Imaging      Butts:  NO   Adequate UO     I&O's Detail           Discussed with:     Cultures:	        Radiology      ACC: [id number] EXAM:  CT PERFUSION W MAPS IC   ORDERED BY: DEBRA REY     ACC: [id number] EXAM:  CT ANGIO NECK (W)AW IC   ORDERED BY: DEBRA REY     ACC: [id number] EXAM:  CT ANGIO BRAIN (W)AW IC   ORDERED BY: DEBRA REY     PROCEDURE DATE:  2023          INTERPRETATION:  Clinical indication: Weakness    Following injection of approximately 90 cc of Omnipaque 350 IV CTA of the   neck and Jena of Ortega were performed. Coronal and sagittal   reconstructions were performed. 3-D MIPS performed.    CT perfusion was performed    CBF<30%: 0.0 mL  MAXIMUM TEMPERATURE> 6.0s: 0.0 mL  Mismatch findin.0 mL  Mismatch racial: None    Both distal common carotid, proximal internal and external carotid   arteries appear normal as well as both vertebral arteries. No significant   stenosis is seen.    Both distal internal carotid arteries appear normal    Evaluation of the anterior cerebral middle cerebral basilar and posterior   cerebral arteries appear normal without evidence of an aneurysm or   significant stenosis.    The dural venous sinuses demonstrate normal enhancement    Evaluation of the soft tissue neck region appears normal.    The visualized portion both lung apices appear clear.    IMPRESSION: Unremarkable CTA of neck and Jena of Ortega.    CT perfusion data as described above.    --- End of Report ---            BABS SANTIAGO MD; Attending Radiologist  This document has been electronically signed. 2023  4:07PM                        
[facility]  INFECTIOUS DISEASES - [address], [place] [address]  Tel: [phone]     Fax: [phone]  =======================================================    [id number]  ROSALINA JACKSON     CC: Patient is a 38y old  Female who presents with a chief complaint of RLE numbness, shaking (16 Nov 2023 10:33)    HPI:  37 yo F PMHx asthma, mycobacterium bovis, pulmonary infection on ethambutol, rifampin and moxifloxacin (since end of March 2023), who was admitted on 11/14 after episode of tingling to her right leg. Pt reports intermittent numbness/tingling to her right leg x months since starting her regimen. She also complained of headache on the left forehead area. Denies any fevers or chills. Denies any cough, hemoptysis, blurring of vision, back pain, rash.    Patient says she was diagnosed with mycobacterium bovis in [facility] and started on treatment March 2023. She was found to have lung nodules that time and underwent bronchoscopy. Initially being treated for latent TB with INH then switched to treatment medications in March. INH was stopped due to adverse reaction and she has been stable on this regimen for months. Says she follows up with Dr. Umm Mcnamara from ID.      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          Social Hx:     FAMILY HISTORY:      Allergies    amoxicillin (Rash)    Intolerances        Antibiotics:  MEDICATIONS  (STANDING):  enoxaparin Injectable 40 milliGRAM(s) SubCutaneous every 24 hours  ethambutol 1000 milliGRAM(s) Oral daily  levETIRAcetam 500 milliGRAM(s) Oral every 12 hours  levoFLOXacin  Tablet 750 milliGRAM(s) Oral every 24 hours  moxifloxacin 400 milliGRAM(s) Oral daily  rifAMPin 600 milliGRAM(s) Oral daily  sodium chloride 0.9%. 1000 milliLiter(s) (60 mL/Hr) IV Continuous <Continuous>    MEDICATIONS  (PRN):  acetaminophen     Tablet .. 650 milliGRAM(s) Oral every 6 hours PRN Temp greater or equal to 38C (100.4F), Mild Pain (1 - 3)  aluminum hydroxide/magnesium hydroxide/simethicone Suspension 30 milliLiter(s) Oral every 4 hours PRN Dyspepsia  melatonin 3 milliGRAM(s) Oral at bedtime PRN Insomnia  ondansetron Injectable 4 milliGRAM(s) IV Push every 8 hours PRN Nausea and/or Vomiting       REVIEW OF SYSTEMS:  CONSTITUTIONAL:  No Fever or chills  HEENT:  No sore throat or runny nose.  CARDIOVASCULAR:  (+) L lateral chest pain  RESPIRATORY:  No cough, occasional shortness of breath  GASTROINTESTINAL:  No nausea, vomiting or diarrhea.  GENITOURINARY:  No dysuria, frequency or urgency  MUSCULOSKELETAL:  no joint aches, no back pain  NEUROLOGIC: see history  PSYCHIATRIC:  No disorder of thought or mood.    Physical Exam:  Vital Signs Last 24 Hrs  T(C): 36.8 (16 Nov 2023 04:58), Max: 36.8 (15 Nov 2023 12:26)  T(F): 98.3 (16 Nov 2023 04:58), Max: 98.3 (15 Nov 2023 12:26)  HR: 76 (16 Nov 2023 08:00) (76 - 112)  BP: 103/62 (16 Nov 2023 04:58) (100/64 - 103/62)  BP(mean): --  RR: 18 (16 Nov 2023 04:58) (18 - 18)  SpO2: 97% (16 Nov 2023 04:58) (96% - 99%)    Parameters below as of 16 Nov 2023 04:58  Patient On (Oxygen Delivery Method): room air        GEN: NAD  HEENT: normocephalic and atraumatic.   NECK: Supple.   LUNGS: Clear to auscultation.  HEART: Regular rate and rhythm   ABDOMEN: Soft, nontender, and nondistended.    : No CVA tenderness  EXTREMITIES: No leg edema.  NEUROLOGIC: grossly intact.  PSYCHIATRIC: Appropriate affect .  SKIN: No ulceration or induration present.    Labs:  11-16    144  |  114<H>  |  11  ----------------------------<  108<H>  3.7   |  25  |  0.89    Ca    8.2<L>      16 Nov 2023 06:46  Mg     2.3     11-16    TPro  7.4  /  Alb  3.7  /  TBili  0.2  /  DBili  <0.1  /  AST  13<L>  /  ALT  18  /  AlkPhos  96  11-15                          12.7   5.59  )-----------( 183      ( 16 Nov 2023 06:46 )             36.7       Urinalysis Basic - ( 16 Nov 2023 06:46 )    Color: x / Appearance: x / SG: x / pH: x  Gluc: 108 mg/dL / Ketone: x  / Bili: x / Urobili: x   Blood: x / Protein: x / Nitrite: x   Leuk Esterase: x / RBC: x / WBC x   Sq Epi: x / Non Sq Epi: x / Bacteria: x      LIVER FUNCTIONS - ( 15 Nov 2023 12:25 )  Alb: 3.7 g/dL / Pro: 7.4 g/dL / ALK PHOS: 96 U/L / ALT: 18 U/L / AST: 13 U/L / GGT: x           CARDIAC MARKERS ( 14 Nov 2023 14:13 )  x     / x     / 31 U/L / x     / x                      RECENT CULTURES:        All imaging and other data have been reviewed.      < from: CT Angio Chest PE Protocol w/ IV Cont (11.15.23 @ 09:00) >  FINDINGS:    LUNGS AND AIRWAYS: Patent central airways.  Few branching tree-in-bud   nodular opacities in the upper lungs bilaterally. No confluent   consolidation.  PLEURA: No pleural effusion.  MEDIASTINUM AND ALMA DELIA: No lymphadenopathy.  VESSELS: Contrast bolus is adequate to the there is no aortic aneurysm or   dissection. Segmental level without acute pulmonary embolism. Suboptimal   contrast bolus limits assessment of subsegmental branches.  HEART: Heart size is normal. No pericardial effusion.  CHEST WALL AND LOWER NECK: Within normal limits.  VISUALIZED UPPER ABDOMEN: Within normal limits.  BONES: Within normal limits.    IMPRESSION:  Contrast bolus is adequate to the segmental level without acute pulmonary   embolism. Suboptimal evaluation of the subsegmental branches.    Few clustered tree-in-bud nodular opacities in the upper lungs   bilaterally be seen in the setting of infectious or inflammatory nodules,   including atypical infection such as TB.        < end of copied text >      < from: MR Head w/wo IV Cont (11.15.23 @ 10:31) >    FINDINGS:  Enhancing posterior paramedian left frontal lobe lesion with suggestion   of a dural tail, and mild internal susceptibility measuring 1.3 x 1.2 x   0.9 cm suggesting a meningioma. Moderate adjacent vasogenic edema.  Right frontal lobe developmental venous anomaly on images 22 through 29   of series 9.  There is noabnormal restricted diffusion to suggest acute infarction. No   other abnormal signal is demonstrated throughout the brain parenchyma.   Normal T2 flow-voids are seen within  the intracranial vasculature. The   lateral ventricles and cortical sulci are age-appropriate in size and   configuration. There is no extra-axial fluid collection. Midline   structures are normal.  The visualized paranasal sinuses, mastoid air   cells and orbits are unremarkable.      IMPRESSION: Posterior paramedian left frontal lobe enhancing lesion with   moderate adjacent vasogenic edema.      < end of copied text >

## 2023-11-16 NOTE — CHART NOTE - NSCHARTNOTEFT_GEN_A_CORE
Called by RN for pt endorsing continuous seizure activity. Pt seen and examined at bedside and is resting comfortably in bed, in no acute distress. Upon questioning, pt states that she feels like she is having a "low seizure" for the last 2.5 hours. When asked to elaborate, pt states that she feels diminished sensation and twitching in her right lower extremity, similar to her presenting symptoms.     Neurological exam: 4/5 RLE strength testing and diminished sensation; 5/5 LLE strength testing, sensation intact; bilateral reflexes intact, no evidence of clonus, hypertonicity, or flaccid paralysis.      Pt received Keppra loading dose yesterday evening as well as 1 mg of Ativan. Pt states that she does not want additional medications at this time, but wanted providers to be aware. Pt is due for next dose of Keppra at 10:00 AM. Pt informed to notify nurse if symptoms return/worsen and we will provide 1mg Ativan. No intervention at this time.

## 2023-11-16 NOTE — PROGRESS NOTE ADULT - PROBLEM SELECTOR PLAN 3
Patient with pulmonary TB diagnosed at the end of March 2023 on treatment for one year  Continue Ethambutol, Rifampin, Moxifloxacin  ID consult

## 2023-11-16 NOTE — CONSULT NOTE ADULT - ASSESSMENT
39 yo F PMHx asthma, mycobacterium bovis, pulmonary infection on ethambutol, rifampin and moxifloxacin (since end of March 2023), who was admitted on 11/14 after episode of tingling to her right leg. Found to have L frontal lobe lesion with moderate vasogenic edema, concerning for meningioma. Discussed with Neurology.    -should be fine to give steroids, but message left for Dr. Mcnamara's office to discuss case  -continue ethambutol, rifampin and moxifloxacin     Thank you for courtesy of this consult.     Will follow.  Discussed with Dr. Shayy Quigley MD  Division of Infectious Diseases   Cell [phone] between 8am and 6pm   After 6pm and weekends please call ID service at [phone].     55 minutes spent on total encounter assessing patient, examination, chart review, counseling and coordinating care by the attending physician/nurse/care manager.    37 yo F PMHx asthma, mycobacterium bovis, pulmonary infection on ethambutol, rifampin and moxifloxacin (since end of March 2023), who was admitted on 11/14 after episode of tingling to her right leg. Found to have L frontal lobe lesion with moderate vasogenic edema, concerning for meningioma. Discussed with Neurology.    -should be fine to give steroids, but message left for Dr. Mcnamara's office to discuss case  -continue ethambutol, rifampin and moxifloxacin     Thank you for courtesy of this consult. I will be covered by Dr. Cook on 11/17/23, and Dr. Shanna Viveros on 11/18-11/19/23.    Discussed with Dr. Shayy Quigley MD  Division of Infectious Diseases   Cell [phone] between 8am and 6pm   After 6pm and weekends please call ID service at [phone].     55 minutes spent on total encounter assessing patient, examination, chart review, counseling and coordinating care by the attending physician/nurse/care manager.    39 yo F PMHx asthma, mycobacterium bovis, pulmonary infection on ethambutol, rifampin and moxifloxacin (since end of March 2023), who was admitted on 11/14 after episode of tingling to her right leg. Found to have L frontal lobe lesion with moderate vasogenic edema, concerning for meningioma. Discussed with Neurology.    #Mycobacterium bovis infection    -should be fine to give steroids, benefits likely outweigh risks  -continue ethambutol, rifampin and moxifloxacin   -repeat ECG prior to d/c to monitor QTC  -ID follow up outpatient with Dr. Mcnamara, case discussed with her    Thank you for courtesy of this consult. I will be covered by Dr. oCok on 11/17/23, and Dr. Shanna Viveros on 11/18-11/19/23.    Discussed with Dr. Shayy Quigley MD  Division of Infectious Diseases   Cell [phone] between 8am and 6pm   After 6pm and weekends please call ID service at [phone].     55 minutes spent on total encounter assessing patient, examination, chart review, counseling and coordinating care by the attending physician/nurse/care manager.

## 2023-11-16 NOTE — PROVIDER CONTACT NOTE (OTHER) - SITUATION
Pt c/o numbness in right foot as if it is asleep. Pt was unsteady on her feet when ambulating to bathroom. Educated pt to use call bell for assistance. Pt received Kepra at

## 2023-11-17 ENCOUNTER — TRANSCRIPTION ENCOUNTER (OUTPATIENT)
Age: 38
End: 2023-11-17

## 2023-11-17 VITALS
DIASTOLIC BLOOD PRESSURE: 62 MMHG | RESPIRATION RATE: 16 BRPM | HEART RATE: 69 BPM | SYSTOLIC BLOOD PRESSURE: 99 MMHG | TEMPERATURE: 98 F | OXYGEN SATURATION: 97 %

## 2023-11-17 PROCEDURE — 82550 ASSAY OF CK (CPK): CPT

## 2023-11-17 PROCEDURE — 70498 CT ANGIOGRAPHY NECK: CPT | Mod: MA

## 2023-11-17 PROCEDURE — 85027 COMPLETE CBC AUTOMATED: CPT

## 2023-11-17 PROCEDURE — 95816 EEG AWAKE AND DROWSY: CPT

## 2023-11-17 PROCEDURE — 93971 EXTREMITY STUDY: CPT

## 2023-11-17 PROCEDURE — 80061 LIPID PANEL: CPT

## 2023-11-17 PROCEDURE — 70496 CT ANGIOGRAPHY HEAD: CPT | Mod: MA

## 2023-11-17 PROCEDURE — 93010 ELECTROCARDIOGRAM REPORT: CPT

## 2023-11-17 PROCEDURE — 97530 THERAPEUTIC ACTIVITIES: CPT

## 2023-11-17 PROCEDURE — 71275 CT ANGIOGRAPHY CHEST: CPT

## 2023-11-17 PROCEDURE — 96374 THER/PROPH/DIAG INJ IV PUSH: CPT

## 2023-11-17 PROCEDURE — 93005 ELECTROCARDIOGRAM TRACING: CPT

## 2023-11-17 PROCEDURE — 0042T: CPT

## 2023-11-17 PROCEDURE — 80048 BASIC METABOLIC PNL TOTAL CA: CPT

## 2023-11-17 PROCEDURE — 36415 COLL VENOUS BLD VENIPUNCTURE: CPT

## 2023-11-17 PROCEDURE — 97162 PT EVAL MOD COMPLEX 30 MIN: CPT

## 2023-11-17 PROCEDURE — 80053 COMPREHEN METABOLIC PANEL: CPT

## 2023-11-17 PROCEDURE — 80076 HEPATIC FUNCTION PANEL: CPT

## 2023-11-17 PROCEDURE — 83036 HEMOGLOBIN GLYCOSYLATED A1C: CPT

## 2023-11-17 PROCEDURE — 70450 CT HEAD/BRAIN W/O DYE: CPT | Mod: MA

## 2023-11-17 PROCEDURE — 85025 COMPLETE CBC W/AUTO DIFF WBC: CPT

## 2023-11-17 PROCEDURE — 97116 GAIT TRAINING THERAPY: CPT

## 2023-11-17 PROCEDURE — 99285 EMERGENCY DEPT VISIT HI MDM: CPT | Mod: 25

## 2023-11-17 PROCEDURE — A9579: CPT

## 2023-11-17 PROCEDURE — 97166 OT EVAL MOD COMPLEX 45 MIN: CPT

## 2023-11-17 PROCEDURE — 83735 ASSAY OF MAGNESIUM: CPT

## 2023-11-17 PROCEDURE — 83605 ASSAY OF LACTIC ACID: CPT

## 2023-11-17 PROCEDURE — 70553 MRI BRAIN STEM W/O & W/DYE: CPT

## 2023-11-17 PROCEDURE — 82962 GLUCOSE BLOOD TEST: CPT

## 2023-11-17 RX ORDER — LEVETIRACETAM 250 MG/1
1 TABLET, FILM COATED ORAL
Qty: 60 | Refills: 0
Start: 2023-11-17 | End: 2023-12-16

## 2023-11-17 RX ORDER — DEXAMETHASONE 0.5 MG/5ML
1 ELIXIR ORAL
Qty: 42 | Refills: 0
Start: 2023-11-17

## 2023-11-17 RX ADMIN — LEVETIRACETAM 500 MILLIGRAM(S): 250 TABLET, FILM COATED ORAL at 05:43

## 2023-11-17 RX ADMIN — ETHAMBUTOL HYDROCHLORIDE 1000 MILLIGRAM(S): 400 TABLET, FILM COATED ORAL at 12:32

## 2023-11-17 RX ADMIN — Medication 4 MILLIGRAM(S): at 05:43

## 2023-11-17 RX ADMIN — MOXIFLOXACIN HYDROCHLORIDE TABLETS, 400 MG 400 MILLIGRAM(S): 400 TABLET, FILM COATED ORAL at 12:32

## 2023-11-17 NOTE — PROGRESS NOTE ADULT - SUBJECTIVE AND OBJECTIVE BOX
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 1000 milliLiter(s) (60 mL/Hr) IV Continuous <Continuous>    MEDICATIONS  (PRN):  acetaminophen     Tablet .. 650 milliGRAM(s) Oral every 6 hours PRN Temp greater or equal to 38C (100.4F), Mild Pain (1 - 3)  aluminum hydroxide/magnesium hydroxide/simethicone Suspension 30 milliLiter(s) Oral every 4 hours PRN Dyspepsia  melatonin 3 milliGRAM(s) Oral at bedtime PRN Insomnia  ondansetron Injectable 4 milliGRAM(s) IV Push every 8 hours PRN Nausea and/or Vomiting         Vitals log        ICU Vital Signs Last 24 Hrs  T(C): 36.8 (16 Nov 2023 04:58), Max: 36.8 (15 Nov 2023 12:26)  T(F): 98.3 (16 Nov 2023 04:58), Max: 98.3 (15 Nov 2023 12:26)  HR: 76 (16 Nov 2023 04:58) (76 - 112)  BP: 103/62 (16 Nov 2023 04:58) (99/65 - 103/62)  BP(mean): --  ABP: --  ABP(mean): --  RR: 18 (16 Nov 2023 04:58) (18 - 19)  SpO2: 97% (16 Nov 2023 04:58) (96% - 99%)    O2 Parameters below as of 16 Nov 2023 04:58  Patient On (Oxygen Delivery Method): room air                 Input and Output:  I&O's Detail    15 Nov 2023 07:01  -  16 Nov 2023 05:15  --------------------------------------------------------  IN:    sodium chloride 0.9%: 540 mL  Total IN: 540 mL    OUT:  Total OUT: 0 mL    Total NET: 540 mL          Lab Data                        13.2   5.60  )-----------( 195      ( 15 Nov 2023 12:25 )             38.7     11-15    142  |  112<H>  |  10  ----------------------------<  84  3.8   |  28  |  0.77    Ca    8.5      15 Nov 2023 12:25  Mg     2.3     11-15    TPro  7.4  /  Alb  3.7  /  TBili  0.2  /  DBili  <0.1  /  AST  13<L>  /  ALT  18  /  AlkPhos  96  11-15      CARDIAC MARKERS ( 14 Nov 2023 14:13 )  x     / x     / 31 U/L / x     / x            Review of Systems	      Objective     Physical Examination    heart s1s2  lung dc BS      Pertinent Lab findings & Imaging      Beckie:  NO   Adequate UO     I&O's Detail    15 Nov 2023 07:01  -  16 Nov 2023 05:15  --------------------------------------------------------  IN:    sodium chloride 0.9%: 540 mL  Total IN: 540 mL    OUT:  Total OUT: 0 mL    Total NET: 540 mL               Discussed with:     Cultures:	        Radiology                            
Neurology Follow up note    ROSALINA [id number]    HPI:  37 yo F PMHx asthma, mycobacterium bovis pulmonary infection on ethambutol, rifampin and moxifloxacin (since end of March) presents to ED after episode of tingling to her right leg x earlier today. Pt reports intermittent numbness/tingling to her right leg x months since starting her regimen. Today, pt reports episode this AM when her right leg went numb, states it felt like it had fallen asleep, tried to "shake it out" but symptoms persisted for several minutes and then pt states her right foot started twitching. Pt states she became very nervous, hyperventilated, and felt twitching sensation throughout her entire body for several minutes. Pt now c/o headache. Pt denies dizziness, chest pain, SOB, worsening cough, abd pain, N/V/D, fever/chills, back pain. Patient reports that she's had pleuritic CP at the base of her lungs since TB diagnosis but it has gotten slightly worse over the past week.  (14 Nov 2023 18:03)      Interval History -right leg numbness improving  no seizure reported    Patient is seen, chart was reviewed and case was discussed with the treatment team.  Pt is not in any distress.   Lying on bed comfortably.     Vital Signs Last 24 Hrs  T(C): 36.4 (17 Nov 2023 05:20), Max: 36.9 (16 Nov 2023 20:33)  T(F): 97.6 (17 Nov 2023 05:20), Max: 98.5 (16 Nov 2023 20:33)  HR: 79 (17 Nov 2023 05:20) (79 - 82)  BP: 95/58 (17 Nov 2023 05:20) (95/58 - 99/54)  BP(mean): --  RR: 18 (17 Nov 2023 05:20) (18 - 18)  SpO2: 98% (17 Nov 2023 05:20) (97% - 98%)    Parameters below as of 17 Nov 2023 05:20  Patient On (Oxygen Delivery Method): room air                REVIEW OF SYSTEMS:    Constitutional: No fever, weight loss or fatigue  Eyes: No eye pain, visual disturbances, or discharge  ENT:  No difficulty hearing, tinnitus, vertigo; No sinus or throat pain  Neck: No pain or stiffness  Respiratory: No cough, wheezing, chills or hemoptysis  Cardiovascular: No chest pain, palpitations, shortness of breath, dizziness or leg swelling  Gastrointestinal: No abdominal or epigastric pain. No nausea, vomiting or hematemesis; No diarrhea or constipation. No melena or hematochezia.  Genitourinary: No dysuria, frequency, hematuria or incontinence  Neurological: No headaches, memory loss, , numbness or tremors  Psychiatric: No depression, anxiety, mood swings or difficulty sleeping  Musculoskeletal: No joint pain or swelling; No muscle, back or extremity pain  Skin: No itching, burning, rashes or lesions   Lymph Nodes: No enlarged glands  Endocrine: No heat or cold intolerance; No hair loss,  Allergy and Immunologic: No hives or eczema    On Neurological Examination:    Mental Status - Pt is alert, awake, oriented X3.  Follows commands well and able to answer questions appropriately.Mood and affect  normal    Speech -  Normal.     Cranial Nerves - Pupils 3 mm equal and reactive to light, extraocular eye movements intact. Pt has no visual field deficit.  Pt has no facial asymmetry. Facial sensation is intact.Tongue - is in midline.    Muscle tone - is normal all over. Moves all extremities equally. No asymmetry is seen.      Motor Exam - 5/5 except RLE 4/5   No drift. No shaking or tremors.    Sensory Exam - Pin prick, temperature, joint position and vibration are intact on either side. Pt withdraws all extremities equally on stimulation. No asymmetry seen. No complaints of tingling, numbness.        coordination:    Finger to nose: normal      Deep tendon Reflexes - 2 plus all over.        Romberg - Negative.    Neck Supple -  Yes.     MEDICATIONS  (STANDING):  dexAMETHasone  Injectable 4 milliGRAM(s) IV Push every 8 hours  enoxaparin Injectable 40 milliGRAM(s) SubCutaneous every 24 hours  ethambutol 1000 milliGRAM(s) Oral daily  levETIRAcetam 500 milliGRAM(s) Oral every 12 hours  moxifloxacin 400 milliGRAM(s) Oral daily  rifAMPin 600 milliGRAM(s) Oral daily  sodium chloride 0.9%. 1000 milliLiter(s) (60 mL/Hr) IV Continuous <Continuous>    MEDICATIONS  (PRN):  acetaminophen     Tablet .. 650 milliGRAM(s) Oral every 6 hours PRN Temp greater or equal to 38C (100.4F), Mild Pain (1 - 3)  aluminum hydroxide/magnesium hydroxide/simethicone Suspension 30 milliLiter(s) Oral every 4 hours PRN Dyspepsia  melatonin 3 milliGRAM(s) Oral at bedtime PRN Insomnia  ondansetron Injectable 4 milliGRAM(s) IV Push every 8 hours PRN Nausea and/or Vomiting      Allergies    amoxicillin (Rash)    Intolerances      11-16    144  |  114<H>  |  11  ----------------------------<  108<H>  3.7   |  25  |  0.89    Ca    8.2<L>      16 Nov 2023 06:46  Mg     2.3     11-16    TPro  7.4  /  Alb  3.7  /  TBili  0.2  /  DBili  <0.1  /  AST  13<L>  /  ALT  18  /  AlkPhos  96  11-15    Hemoglobin A1C:     Vitamin B12     RADIOLOGY    ASSESSMENT AND PLAN:      seen for new onset seizure  focal motor seizure related to -  left frontal meningioma    switch to oral decadron  decadron 4 mg tid for 1 wk followed by 4 mg bid for 1 wk then 4 mg qd for 1 wki- dc  increase   keppra to 750 mg bid  NS eval as out patient  discharge planning  management dw dr vazquez  Physical therapy evaluation.  ID juliana appreciated  Pain is accessed and addressed.  Plan of care was discussed with family. Questions answered.  Would continue to follow.  
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 1000 milliLiter(s) (60 mL/Hr) IV Continuous <Continuous>    MEDICATIONS  (PRN):  acetaminophen     Tablet .. 650 milliGRAM(s) Oral every 6 hours PRN Temp greater or equal to 38C (100.4F), Mild Pain (1 - 3)  aluminum hydroxide/magnesium hydroxide/simethicone Suspension 30 milliLiter(s) Oral every 4 hours PRN Dyspepsia  melatonin 3 milliGRAM(s) Oral at bedtime PRN Insomnia  ondansetron Injectable 4 milliGRAM(s) IV Push every 8 hours PRN Nausea and/or Vomiting         Vitals log        ICU Vital Signs Last 24 Hrs  T(C): 36.9 (16 Nov 2023 20:33), Max: 36.9 (16 Nov 2023 20:33)  T(F): 98.5 (16 Nov 2023 20:33), Max: 98.5 (16 Nov 2023 20:33)  HR: 82 (16 Nov 2023 20:33) (76 - 82)  BP: 97/61 (16 Nov 2023 20:33) (97/61 - 99/54)  BP(mean): --  ABP: --  ABP(mean): --  RR: 18 (16 Nov 2023 20:33) (18 - 18)  SpO2: 97% (16 Nov 2023 20:33) (97% - 98%)    O2 Parameters below as of 16 Nov 2023 20:33  Patient On (Oxygen Delivery Method): room air                 Input and Output:  I&O's Detail    15 Nov 2023 07:01  -  16 Nov 2023 07:00  --------------------------------------------------------  IN:    sodium chloride 0.9%: 720 mL  Total IN: 720 mL    OUT:  Total OUT: 0 mL    Total NET: 720 mL          Lab Data                        12.7   5.59  )-----------( 183      ( 16 Nov 2023 06:46 )             36.7     11-16    144  |  114<H>  |  11  ----------------------------<  108<H>  3.7   |  25  |  0.89    Ca    8.2<L>      16 Nov 2023 06:46  Mg     2.3     11-16    TPro  7.4  /  Alb  3.7  /  TBili  0.2  /  DBili  <0.1  /  AST  13<L>  /  ALT  18  /  AlkPhos  96  11-15            Review of Systems	      Objective     Physical Examination    heart s1s2  lung dc BS  head nc      Pertinent Lab findings & Imaging      Beckie:  NO   Adequate UO     I&O's Detail    15 Nov 2023 07:01  -  16 Nov 2023 07:00  --------------------------------------------------------  IN:    sodium chloride 0.9%: 720 mL  Total IN: 720 mL    OUT:  Total OUT: 0 mL    Total NET: 720 mL               Discussed with:     Cultures:	        Radiology                            
Neurology Follow up note    ROSALINA [id number]    HPI:  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, rifampin and moxifloxacin (since end of March) presents to ED after episode of tingling to her right leg x earlier today. Pt reports intermittent numbness/tingling to her right leg x months since starting her regimen. Today, pt reports episode this AM when her right leg went numb, states it felt like it had fallen asleep, tried to "shake it out" but symptoms persisted for several minutes and then pt states her right foot started twitching. Pt states she became very nervous, hyperventilated, and felt twitching sensation throughout her entire body for several minutes. Pt now c/o headache. Pt denies dizziness, chest pain, SOB, worsening cough, abd pain, N/V/D, fever/chills, back pain. Patient reports that she's had pleuritic CP at the base of her lungs since TB diagnosis but it has gotten slightly worse over the past week.  (14 Nov 2023 18:03)      Interval History -no sz reported    Patient is seen, chart was reviewed and case was discussed with the treatment team.  Pt is not in any distress.   Lying on bed comfortably.     Vital Signs Last 24 Hrs  T(C): 36.3 (16 Nov 2023 12:44), Max: 36.8 (16 Nov 2023 04:58)  T(F): 97.4 (16 Nov 2023 12:44), Max: 98.3 (16 Nov 2023 04:58)  HR: 79 (16 Nov 2023 12:44) (76 - 112)  BP: 99/54 (16 Nov 2023 12:44) (99/54 - 103/62)  BP(mean): --  RR: 18 (16 Nov 2023 12:44) (18 - 18)  SpO2: 98% (16 Nov 2023 12:44) (97% - 99%)    Parameters below as of 16 Nov 2023 12:44  Patient On (Oxygen Delivery Method): room air            REVIEW OF SYSTEMS:    Constitutional: No fever, weight loss or fatigue  Eyes: No eye pain, visual disturbances, or discharge  ENT:  No difficulty hearing, tinnitus, vertigo; No sinus or throat pain  Neck: No pain or stiffness  Respiratory: No cough, wheezing, chills or hemoptysis  Cardiovascular: No chest pain, palpitations, shortness of breath, dizziness or leg swelling  Gastrointestinal: No abdominal or epigastric pain. No nausea, vomiting or hematemesis; No diarrhea or constipation. No melena or hematochezia.  Genitourinary: No dysuria, frequency, hematuria or incontinence  Neurological: No headaches, memory loss, , numbness or tremors  Psychiatric: No depression, anxiety, mood swings or difficulty sleeping  Musculoskeletal: No joint pain or swelling; No muscle, back or extremity pain  Skin: No itching, burning, rashes or lesions   Lymph Nodes: No enlarged glands  Endocrine: No heat or cold intolerance; No hair loss,  Allergy and Immunologic: No hives or eczema    On Neurological Examination:    Mental Status - Pt is alert, awake, oriented X3.  Follows commands well and able to answer questions appropriately.Mood and affect  normal    Speech -  Normal.     Cranial Nerves - Pupils 3 mm equal and reactive to light, extraocular eye movements intact. Pt has no visual field deficit.  Pt has no facial asymmetry. Facial sensation is intact.Tongue - is in midline.    Muscle tone - is normal all over. Moves all extremities equally. No asymmetry is seen.      Motor Exam - 5/5 except RLE 4/5   No drift. No shaking or tremors.    Sensory Exam - Pin prick, temperature, joint position and vibration are intact on either side. Pt withdraws all extremities equally on stimulation. No asymmetry seen. No complaints of tingling, numbness.        coordination:    Finger to nose: normal      Deep tendon Reflexes - 2 plus all over.        Romberg - Negative.    Neck Supple -  Yes.     MEDICATIONS    acetaminophen     Tablet .. 650 milliGRAM(s) Oral every 6 hours PRN  aluminum hydroxide/magnesium hydroxide/simethicone Suspension 30 milliLiter(s) Oral every 4 hours PRN  dexAMETHasone  Injectable 4 milliGRAM(s) IV Push every 8 hours  enoxaparin Injectable 40 milliGRAM(s) SubCutaneous every 24 hours  ethambutol 1000 milliGRAM(s) Oral daily  levETIRAcetam 500 milliGRAM(s) Oral every 12 hours  melatonin 3 milliGRAM(s) Oral at bedtime PRN  moxifloxacin 400 milliGRAM(s) Oral daily  ondansetron Injectable 4 milliGRAM(s) IV Push every 8 hours PRN  rifAMPin 600 milliGRAM(s) Oral daily  sodium chloride 0.9%. 1000 milliLiter(s) IV Continuous <Continuous>      Allergies    amoxicillin (Rash)    Intolerances        LABS:  CBC Full  -  ( 16 Nov 2023 06:46 )  WBC Count : 5.59 K/uL  RBC Count : 4.49 M/uL  Hemoglobin : 12.7 g/dL  Hematocrit : 36.7 %  Platelet Count - Automated : 183 K/uL  Mean Cell Volume : 81.7 fl  Mean Cell Hemoglobin : 28.3 pg  Mean Cell Hemoglobin Concentration : 34.6 gm/dL  Auto Neutrophil # : x  Auto Lymphocyte # : x  Auto Monocyte # : x  Auto Eosinophil # : x  Auto Basophil # : x  Auto Neutrophil % : x  Auto Lymphocyte % : x  Auto Monocyte % : x  Auto Eosinophil % : x  Auto Basophil % : x    Urinalysis Basic - ( 16 Nov 2023 06:46 )    Color: x / Appearance: x / SG: x / pH: x  Gluc: 108 mg/dL / Ketone: x  / Bili: x / Urobili: x   Blood: x / Protein: x / Nitrite: x   Leuk Esterase: x / RBC: x / WBC x   Sq Epi: x / Non Sq Epi: x / Bacteria: x      11-16    144  |  114<H>  |  11  ----------------------------<  108<H>  3.7   |  25  |  0.89    Ca    8.2<L>      16 Nov 2023 06:46  Mg     2.3     11-16    TPro  7.4  /  Alb  3.7  /  TBili  0.2  /  DBili  <0.1  /  AST  13<L>  /  ALT  18  /  AlkPhos  96  11-15    Hemoglobin A1C:     Vitamin B12     RADIOLOGY    ASSESSMENT AND PLAN:      seen for new onset seizure  focal motor seizure related to -  left frontal meningioma    IV decadron  continue  keppra  Physical therapy evaluation.  ID winstonal appreciated  Pain is accessed and addressed.  Plan of care was discussed with family. Questions answered.  Would continue to follow.              
Date of Service: 11-15-23 @ 09:22    Patient is a 38y old  Female who presents with a chief complaint of RLE numbness, shaking (15 Nov 2023 05:21)      INTERVAL HPI/OVERNIGHT EVENTS: Patient seen and examined. NAD. No complaints.    Vital Signs Last 24 Hrs  T(C): 36.7 (15 Nov 2023 06:08), Max: 36.9 (14 Nov 2023 16:31)  T(F): 98.1 (15 Nov 2023 06:08), Max: 98.5 (14 Nov 2023 16:31)  HR: 77 (15 Nov 2023 06:08) (77 - 98)  BP: 99/65 (15 Nov 2023 06:08) (99/65 - 128/74)  BP(mean): --  RR: 19 (15 Nov 2023 06:08) (16 - 20)  SpO2: 98% (15 Nov 2023 06:08) (96% - 99%)    Parameters below as of 15 Nov 2023 06:08  Patient On (Oxygen Delivery Method): room air        11-14    144  |  111<H>  |  12  ----------------------------<  83  3.5   |  28  |  0.94    Ca    9.0      14 Nov 2023 14:13  Mg     2.6     11-14    TPro  8.6<H>  /  Alb  4.1  /  TBili  0.7  /  DBili  x   /  AST  19  /  ALT  19  /  AlkPhos  109  11-14                          14.4   7.20  )-----------( 202      ( 14 Nov 2023 14:13 )             42.4       CAPILLARY BLOOD GLUCOSE        Urinalysis Basic - ( 14 Nov 2023 14:13 )    Color: x / Appearance: x / SG: x / pH: x  Gluc: 83 mg/dL / Ketone: x  / Bili: x / Urobili: x   Blood: x / Protein: x / Nitrite: x   Leuk Esterase: x / RBC: x / WBC x   Sq Epi: x / Non Sq Epi: x / Bacteria: x              acetaminophen     Tablet .. 650 milliGRAM(s) Oral every 6 hours PRN  aluminum hydroxide/magnesium hydroxide/simethicone Suspension 30 milliLiter(s) Oral every 4 hours PRN  enoxaparin Injectable 40 milliGRAM(s) SubCutaneous every 24 hours  ethambutol 1000 milliGRAM(s) Oral daily  levoFLOXacin  Tablet 750 milliGRAM(s) Oral every 24 hours  melatonin 3 milliGRAM(s) Oral at bedtime PRN  ondansetron Injectable 4 milliGRAM(s) IV Push every 8 hours PRN  rifAMPin 600 milliGRAM(s) Oral daily  sodium chloride 0.9%. 1000 milliLiter(s) IV Continuous <Continuous>              REVIEW OF SYSTEMS:  CONSTITUTIONAL: No fever, no weight loss, or no fatigue  NECK: No pain, no stiffness  RESPIRATORY: No cough, no wheezing, no chills, no hemoptysis, No shortness of breath  CARDIOVASCULAR: No chest pain, no palpitations, no dizziness, no leg swelling  GASTROINTESTINAL: No abdominal pain. No nausea, no vomiting, no hematemesis; No diarrhea, no constipation. No melena, no hematochezia.  GENITOURINARY: No dysuria, no frequency, no hematuria, no incontinence  NEUROLOGICAL: No headaches, no loss of strength, no numbness, no tremors  SKIN: No itching, no burning  MUSCULOSKELETAL: No joint pain, no swelling; No muscle, no back, no extremity pain  PSYCHIATRIC: No depression, no mood swings,   HEME/LYMPH: No easy bruising, no bleeding gums  ALLERY AND IMMUNOLOGIC: No hives       Consultant(s) Notes Reviewed:  [X] YES  [ ] NO    PHYSICAL EXAM:  GENERAL: NAD  HEAD:  Atraumatic, Normocephalic  EYES: EOMI, PERRLA, conjunctiva and sclera clear  ENMT: No tonsillar erythema, exudates, or enlargement; Moist mucous membranes  NECK: Supple, No JVD  NERVOUS SYSTEM:  Awake & alert  CHEST/LUNG: Clear to auscultation bilaterally; No rales, rhonchi, wheezing,  HEART: Regular rate and rhythm  ABDOMEN: Soft, Nontender, Nondistended; Bowel sounds present  EXTREMITIES:  No clubbing, cyanosis, or edema  LYMPH: No lymphadenopathy noted  SKIN: No rashes      Advanced care planning discussed with patient/family [X] YES   [ ] NO    Advanced care planning discussed with patient/family. Patient's health status was discussed. All appropriate changes have been made regarding patient's end-of-life care. Advanced care planning forms reviewed/discussed/completed.  20 minutes spent.   
Date of Service: 11-16-23 @ 10:33    Patient is a 38y old  Female who presents with a chief complaint of RLE numbness, shaking (16 Nov 2023 05:14)      INTERVAL HPI/OVERNIGHT EVENTS: Patient seen and examined. NAD. No complaints.    Vital Signs Last 24 Hrs  T(C): 36.8 (16 Nov 2023 04:58), Max: 36.8 (15 Nov 2023 12:26)  T(F): 98.3 (16 Nov 2023 04:58), Max: 98.3 (15 Nov 2023 12:26)  HR: 76 (16 Nov 2023 08:00) (76 - 112)  BP: 103/62 (16 Nov 2023 04:58) (100/64 - 103/62)  BP(mean): --  RR: 18 (16 Nov 2023 04:58) (18 - 18)  SpO2: 97% (16 Nov 2023 04:58) (96% - 99%)    Parameters below as of 16 Nov 2023 04:58  Patient On (Oxygen Delivery Method): room air        11-16    144  |  114<H>  |  11  ----------------------------<  108<H>  3.7   |  25  |  0.89    Ca    8.2<L>      16 Nov 2023 06:46  Mg     2.3     11-16    TPro  7.4  /  Alb  3.7  /  TBili  0.2  /  DBili  <0.1  /  AST  13<L>  /  ALT  18  /  AlkPhos  96  11-15                          12.7   5.59  )-----------( 183      ( 16 Nov 2023 06:46 )             36.7       CAPILLARY BLOOD GLUCOSE        Urinalysis Basic - ( 16 Nov 2023 06:46 )    Color: x / Appearance: x / SG: x / pH: x  Gluc: 108 mg/dL / Ketone: x  / Bili: x / Urobili: x   Blood: x / Protein: x / Nitrite: x   Leuk Esterase: x / RBC: x / WBC x   Sq Epi: x / Non Sq Epi: x / Bacteria: x    < from: MR Head w/wo IV Cont (11.15.23 @ 10:31) >    ACC: [id number] EXAM:  MR BRAIN WAW IC   ORDERED BY: NATALIE CALLEJAS     PROCEDURE DATE:  11/15/2023          INTERPRETATION:  CLINICAL INDICATIONS: R/O CVA, mass    COMPARISON: Head CT 11/14/2023. CT head dated 11/14/2023    TECHNIQUE: MRI brain: Multiplanar, multisequence MR imaging of the brain   are obtained with and without the administration of 6 cc intravenous   Gadavist contrast. 1.5 cc of contrast was discarded    FINDINGS:  Enhancing posterior paramedian left frontal lobe lesion with suggestion   of a dural tail, and mild internal susceptibility measuring 1.3 x 1.2 x   0.9 cm suggesting a meningioma. Moderate adjacent vasogenic edema.  Right frontal lobe developmental venous anomaly on images 22 through 29   of series 9.  There is noabnormal restricted diffusion to suggest acute infarction. No   other abnormal signal is demonstrated throughout the brain parenchyma.   Normal T2 flow-voids are seen within  the intracranial vasculature. The   lateral ventricles and cortical sulci are age-appropriate in size and   configuration. There is no extra-axial fluid collection. Midline   structures are normal.  The visualized paranasal sinuses, mastoid air   cells and orbits are unremarkable.      IMPRESSION: Posterior paramedian left frontal lobe enhancing lesion with   moderate adjacent vasogenic edema.    --- End of Report ---            EDWIN GRECO MD; Attending Radiologist  This document has been electronically signed. Nov 15 2023 10:50AM    < end of copied text >  < from: EEG Awake or Drowsy (11.16.23 @ 00:00) >    ACC: [id number] EXAM:  EEG-AWAKE AND DROWSY   ORDERED BY: ANTONIO REID     PROCEDURE DATE:  11/16/2023          INTERPRETATION:  GENERAL DESCRIPTION:  The EEG was recorded with a 32-channel digital EEG machine. Standard   interval 10/20 lateral replacements were used.    CONDITIONS OF RECORDING: The EEG was carried out with the awake tense   ,drowsy and sleeping states. Muscle movements artifacts are present   during the waking record.    DESCRIPTION: The resting waking background rhythms consist of low to   moderate voltage, symmetrical activity which is fairly well organized.   The frequency spectrum shows a moderate amount of theta and beta activity   and a negligible amount of delta activity. The posterior dominant rhythm   is 8Hz.    The patient drowses with slowing to irregular low voltage theta and beta   activity. The patient goes into light sleep with symmetrical sleep   spindles and vertex wave activity.    Hyperventilation was not performed because a medical issues. Intermittent   photic stimulation from 2-20 flashes per second fails to elicit any   abnormality.      There were no areas of focal slowing, epilepticform discharges or   electrographic seizures.    IMPRESSION: Normal EEG.    COMMENT: A video 24-48 hours EEG might be desirable to further evaluate   for possible seizure disorder, if clinically necessary. This EEG does not   exclude the clinical diagnosis of seizures or epilepsy.    --- End of Report ---            JAN LEMONS MD; Attending Neurologist  This document has been electronically signed. Nov 16 2023  9:19AM    < end of copied text >            acetaminophen     Tablet .. 650 milliGRAM(s) Oral every 6 hours PRN  aluminum hydroxide/magnesium hydroxide/simethicone Suspension 30 milliLiter(s) Oral every 4 hours PRN  enoxaparin Injectable 40 milliGRAM(s) SubCutaneous every 24 hours  ethambutol 1000 milliGRAM(s) Oral daily  levETIRAcetam 500 milliGRAM(s) Oral every 12 hours  levoFLOXacin  Tablet 750 milliGRAM(s) Oral every 24 hours  melatonin 3 milliGRAM(s) Oral at bedtime PRN  moxifloxacin 400 milliGRAM(s) Oral daily  ondansetron Injectable 4 milliGRAM(s) IV Push every 8 hours PRN  rifAMPin 600 milliGRAM(s) Oral daily  sodium chloride 0.9%. 1000 milliLiter(s) IV Continuous <Continuous>              REVIEW OF SYSTEMS:  CONSTITUTIONAL: No fever, no weight loss, or no fatigue  NECK: No pain, no stiffness  RESPIRATORY: No cough, no wheezing, no chills, no hemoptysis, No shortness of breath  CARDIOVASCULAR: No chest pain, no palpitations, no dizziness, no leg swelling  GASTROINTESTINAL: No abdominal pain. No nausea, no vomiting, no hematemesis; No diarrhea, no constipation. No melena, no hematochezia.  GENITOURINARY: No dysuria, no frequency, no hematuria, no incontinence  NEUROLOGICAL: No headaches, no loss of strength, no numbness, no tremors  SKIN: No itching, no burning  MUSCULOSKELETAL: No joint pain, no swelling; No muscle, no back, no extremity pain  PSYCHIATRIC: No depression, no mood swings,   HEME/LYMPH: No easy bruising, no bleeding gums  ALLERY AND IMMUNOLOGIC: No hives       Consultant(s) Notes Reviewed:  [X] YES  [ ] NO    PHYSICAL EXAM:  GENERAL: NAD  HEAD:  Atraumatic, Normocephalic  EYES: EOMI, PERRLA, conjunctiva and sclera clear  ENMT: No tonsillar erythema, exudates, or enlargement; Moist mucous membranes  NECK: Supple, No JVD  NERVOUS SYSTEM:  Awake & alert  CHEST/LUNG: Clear to auscultation bilaterally; No rales, rhonchi, wheezing,  HEART: Regular rate and rhythm  ABDOMEN: Soft, Nontender, Nondistended; Bowel sounds present  EXTREMITIES:  No clubbing, cyanosis, or edema  LYMPH: No lymphadenopathy noted  SKIN: No rashes      Advanced care planning discussed with patient/family [X] YES   [ ] NO    Advanced care planning discussed with patient/family. Patient's health status was discussed. All appropriate changes have been made regarding patient's end-of-life care. Advanced care planning forms reviewed/discussed/completed.  20 minutes spent.   
Date of Service: 11-17-23 @ 10:05    Patient is a 38y old  Female who presents with a chief complaint of RLE numbness, shaking (17 Nov 2023 05:21)      INTERVAL HPI/OVERNIGHT EVENTS: Patient seen and examined. NAD. Still with RLE numbness    Vital Signs Last 24 Hrs  T(C): 36.4 (17 Nov 2023 05:20), Max: 36.9 (16 Nov 2023 20:33)  T(F): 97.6 (17 Nov 2023 05:20), Max: 98.5 (16 Nov 2023 20:33)  HR: 79 (17 Nov 2023 05:20) (79 - 82)  BP: 95/58 (17 Nov 2023 05:20) (95/58 - 99/54)  BP(mean): --  RR: 18 (17 Nov 2023 05:20) (18 - 18)  SpO2: 98% (17 Nov 2023 05:20) (97% - 98%)    Parameters below as of 17 Nov 2023 05:20  Patient On (Oxygen Delivery Method): room air        11-16    144  |  114<H>  |  11  ----------------------------<  108<H>  3.7   |  25  |  0.89    Ca    8.2<L>      16 Nov 2023 06:46  Mg     2.3     11-16    TPro  7.4  /  Alb  3.7  /  TBili  0.2  /  DBili  <0.1  /  AST  13<L>  /  ALT  18  /  AlkPhos  96  11-15                          12.7   5.59  )-----------( 183      ( 16 Nov 2023 06:46 )             36.7       CAPILLARY BLOOD GLUCOSE        Urinalysis Basic - ( 16 Nov 2023 06:46 )    Color: x / Appearance: x / SG: x / pH: x  Gluc: 108 mg/dL / Ketone: x  / Bili: x / Urobili: x   Blood: x / Protein: x / Nitrite: x   Leuk Esterase: x / RBC: x / WBC x   Sq Epi: x / Non Sq Epi: x / Bacteria: x              acetaminophen     Tablet .. 650 milliGRAM(s) Oral every 6 hours PRN  aluminum hydroxide/magnesium hydroxide/simethicone Suspension 30 milliLiter(s) Oral every 4 hours PRN  dexAMETHasone  Injectable 4 milliGRAM(s) IV Push every 8 hours  enoxaparin Injectable 40 milliGRAM(s) SubCutaneous every 24 hours  ethambutol 1000 milliGRAM(s) Oral daily  levETIRAcetam 500 milliGRAM(s) Oral every 12 hours  melatonin 3 milliGRAM(s) Oral at bedtime PRN  moxifloxacin 400 milliGRAM(s) Oral daily  ondansetron Injectable 4 milliGRAM(s) IV Push every 8 hours PRN  rifAMPin 600 milliGRAM(s) Oral daily  sodium chloride 0.9%. 1000 milliLiter(s) IV Continuous <Continuous>                  REVIEW OF SYSTEMS:  CONSTITUTIONAL: No fever, no weight loss, or no fatigue  NECK: No pain, no stiffness  RESPIRATORY: No cough, no wheezing, no chills, no hemoptysis, No shortness of breath  CARDIOVASCULAR: No chest pain, no palpitations, no dizziness, no leg swelling  GASTROINTESTINAL: No abdominal pain. No nausea, no vomiting, no hematemesis; No diarrhea, no constipation. No melena, no hematochezia.  GENITOURINARY: No dysuria, no frequency, no hematuria, no incontinence  NEUROLOGICAL: No headaches, no loss of strength, +RLE numbness, no tremors  SKIN: No itching, no burning  MUSCULOSKELETAL: No joint pain, no swelling; No muscle, no back, no extremity pain  PSYCHIATRIC: No depression, no mood swings,   HEME/LYMPH: No easy bruising, no bleeding gums  ALLERY AND IMMUNOLOGIC: No hives       Consultant(s) Notes Reviewed:  [X] YES  [ ] NO    PHYSICAL EXAM:  GENERAL: NAD  HEAD:  Atraumatic, Normocephalic  EYES: EOMI, PERRLA, conjunctiva and sclera clear  ENMT: No tonsillar erythema, exudates, or enlargement; Moist mucous membranes  NECK: Supple, No JVD  NERVOUS SYSTEM:  Awake & alert  CHEST/LUNG: Clear to auscultation bilaterally; No rales, rhonchi, wheezing,  HEART: Regular rate and rhythm  ABDOMEN: Soft, Nontender, Nondistended; Bowel sounds present  EXTREMITIES:  No clubbing, cyanosis, or edema  LYMPH: No lymphadenopathy noted  SKIN: No rashes      Advanced care planning discussed with patient/family [X] YES   [ ] NO    Advanced care planning discussed with patient/family. Patient's health status was discussed. All appropriate changes have been made regarding patient's end-of-life care. Advanced care planning forms reviewed/discussed/completed.  20 minutes spent.

## 2023-11-17 NOTE — PROGRESS NOTE ADULT - ASSESSMENT
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

## 2023-11-17 NOTE — OCCUPATIONAL THERAPY INITIAL EVALUATION ADULT - ADDITIONAL COMMENTS
Pt lives in a basement apartment with 8 steps to enter with no handrail. Pt has a stall shower. Pt drives a car. Pt is right hand dominant. Pt able to write legibly though reports increased time and less control noted for fine motor activities right hand. Pt performed sit to stand and ambulated 60' with no devices close supervision. Pt lives in a basement apartment with 8 steps to enter with no handrail. Pt has a stall shower. Pt drives a car. Pt is right hand dominant. Pt able to write legibly though reports increased time and less control noted for fine motor activities right hand. Pt reports weakness/decreased sensation Right LE. Pt performed sit to stand and ambulated 60' with no devices close supervision. Pt s/p CVA/TIA with MRS=2.

## 2023-11-17 NOTE — CASE MANAGEMENT PROGRESS NOTE - NSCMPROGRESSNOTE_GEN_ALL_CORE
Discussed patient in interdisciplinary rounds.  Patient is for transition home today.  Patient was recommended for outpatient PT and RX provided.  Patient states she has transportation home today.  Will remain available.

## 2023-11-17 NOTE — PROGRESS NOTE ADULT - PROBLEM SELECTOR PROBLEM 3
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

## 2023-11-17 NOTE — OCCUPATIONAL THERAPY INITIAL EVALUATION ADULT - PERTINENT HX OF CURRENT PROBLEM, REHAB EVAL
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 Pt reports intermittent numbness/tingling to her right leg x months since starting her regimen. Pt reports episode that her right leg went numb, states it felt like it had fallen asleep, tried to "shake it out" but symptoms persisted for several minutes and then pt states her right foot started twitching. Pt states she became very nervous, hyperventilated, and felt twitching sensation throughout her entire body for several minutes. Pt now c/o headache. CT head 11/14/23: Posterior paramedian left frontal lobe enhancing lesion with moderate adjacent vasogenic edema. MR brain: posterior paramedian left frontal lobe enhancing lesion with moderate adjacent vasogenic edema. 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 Pt reports intermittent numbness/tingling to her right leg x months since starting her regimen. Pt reports episode that her right leg went numb, states it felt like it had fallen asleep, tried to "shake it out" but symptoms persisted for several minutes and then pt states her right foot started twitching. Pt states she became very nervous, hyperventilated, and felt twitching sensation throughout her entire body for several minutes. Pt now c/o headache. CT head 11/14/23: Posterior paramedian left frontal lobe enhancing lesion with moderate adjacent vasogenic edema. MR brain 11/15/23: posterior paramedian left frontal lobe enhancing lesion with moderate adjacent vasogenic edema. 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 Pt reports intermittent numbness/tingling to her right leg x months since starting her regimen. Pt reports episode that her right leg went numb, states it felt like it had fallen asleep, tried to "shake it out" but symptoms persisted for several minutes and then pt states her right foot started twitching. Pt states she became very nervous, hyperventilated, and felt twitching sensation throughout her entire body for several minutes. Pt now c/o headache. CT head 11/14/23: Posterior paramedian left frontal lobe enhancing lesion with moderate adjacent vasogenic edema. MR brain 11/15/23: posterior paramedian left frontal lobe enhancing lesion with moderate adjacent vasogenic edema. Pt admitted 11/14/23 with cerebral edema.

## 2023-11-17 NOTE — PROGRESS NOTE ADULT - PROBLEM SELECTOR PLAN 1
Abnormality seen on CT head -- R/O CVA, R/O mass  Check MRI Brain with iv gadolinium, ECHO   Neuro check q4h  Seizure precautions  PT  Neuro eval  Further work-up/management pending clinical course.
Abnormality seen on CT head  MRI noted  +Seizures -- started on Keppra  Now on decadron 4mg tid x 1 week and taper by 4mg qweek -- total 3 weeks  Will need outpatient neurosurgery f/u  Seizure precautions  PT  Neuro eval noted  Told patient and  that she cannot drive  Further work-up/management pending clinical course.
Abnormality seen on CT head  MRI noted  +Seizures -- started on Keppra  Will start steroids if ok with ID  Will need outpatient neurosurgery f/u  Neuro check q4h  Seizure precautions  PT  Neuro eval noted  Told patient and  that she cannot drive  Further work-up/management pending clinical course.

## 2023-11-17 NOTE — PROGRESS NOTE ADULT - PROBLEM SELECTOR PLAN 2
Dyspnea with associated pleuritic chest pain -- acute on chronic -- likely 2/2 TB  CTA no PE  Albuterol prn  Pulmonary consult noted  Further work-up/management pending clinical course.
Dyspnea with associated pleuritic chest pain -- acute on chronic -- likely 2/2 TB  CTA no PE  Albuterol prn  Pulmonary consult noted  Further work-up/management pending clinical course.
Dyspnea with associated pleuritic chest pain -- acute on chronic  Check CTA chest  Albuterol prn  Pulmonary consult noted  Further work-up/management pending clinical course.

## 2023-11-17 NOTE — OCCUPATIONAL THERAPY INITIAL EVALUATION ADULT - RANGE OF MOTION EXAMINATION, UPPER EXTREMITY
Muscle strength: 5/5 t/o UE's. Opposition and fine motor skills: WFL's/bilateral UE Active ROM was WFL  (within functional limits)

## 2023-11-17 NOTE — PROGRESS NOTE ADULT - PROBLEM SELECTOR PLAN 3
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

## 2023-11-17 NOTE — PROGRESS NOTE ADULT - REASON FOR ADMISSION
RLE numbness, shaking

## 2023-11-17 NOTE — DISCHARGE NOTE NURSING/CASE MANAGEMENT/SOCIAL WORK - NSDCPEFALRISK_GEN_ALL_CORE
For information on Fall & Injury Prevention, visit: [email or web address] OR  [email or web address] OR  https://www.cdc.gov/steadi/patient.html

## 2023-11-17 NOTE — DISCHARGE NOTE NURSING/CASE MANAGEMENT/SOCIAL WORK - PATIENT PORTAL LINK FT
You can access the FollowMyHealth Patient Portal offered by [facility] by registering at the following website: [email or web address]. By joining [email or web address] FollowMyHealth portal, you will also be able to view your health information using other applications (apps) compatible with our system.

## 2024-12-10 NOTE — PATIENT PROFILE ADULT - FALL HARM RISK - PATIENT NEEDS ASSISTANCE
Chronic, at goal (stable), continue current treatment plan    Orders:    irbesartan (AVAPRO) 300 MG tablet; Take 1 tablet by mouth daily    amLODIPine (NORVASC) 10 MG tablet; Take 1 tablet by mouth daily    metoprolol succinate (TOPROL XL) 50 MG extended release tablet; Take 1 tablet by mouth daily    Comprehensive Metabolic Panel; Future    Lipid Panel; Future     No assistance needed

## 2025-06-03 NOTE — ED ADULT NURSE NOTE - NSFALLRISKINTERV_ED_ALL_ED
Communicate fall risk and risk factors to all staff, patient, and family/Provide visual cue: yellow wristband, yellow gown, etc/Reinforce activity limits and safety measures with patient and family/Call bell, personal items and telephone in reach/Instruct patient to call for assistance before getting out of bed/chair/stretcher/Non-slip footwear applied when patient is off stretcher/[place] to call system/Physically safe environment - no spills, clutter or unnecessary equipment/Purposeful Proactive Rounding/Room/bathroom lighting operational, light cord in reach
Initial (On Arrival)